# Patient Record
Sex: FEMALE | Race: WHITE | Employment: FULL TIME | URBAN - METROPOLITAN AREA
[De-identification: names, ages, dates, MRNs, and addresses within clinical notes are randomized per-mention and may not be internally consistent; named-entity substitution may affect disease eponyms.]

---

## 2017-01-06 ENCOUNTER — APPOINTMENT (OUTPATIENT)
Dept: NUTRITION/OBESITY MEDICINE | Facility: HOSPITAL | Age: 47
End: 2017-01-06
Attending: SURGERY
Payer: COMMERCIAL

## 2017-01-10 ENCOUNTER — APPOINTMENT (OUTPATIENT)
Dept: NUTRITION/OBESITY MEDICINE | Facility: HOSPITAL | Age: 47
End: 2017-01-10
Attending: SURGERY
Payer: COMMERCIAL

## 2017-01-10 ENCOUNTER — APPOINTMENT (OUTPATIENT)
Dept: NUTRITION/OBESITY MEDICINE | Facility: HOSPITAL | Age: 47
End: 2017-01-10
Attending: INTERNAL MEDICINE
Payer: COMMERCIAL

## 2017-01-11 ENCOUNTER — OFFICE VISIT (OUTPATIENT)
Dept: NUTRITION/OBESITY MEDICINE | Facility: HOSPITAL | Age: 47
End: 2017-01-11
Attending: SURGERY
Payer: COMMERCIAL

## 2017-01-11 VITALS — HEIGHT: 64.5 IN | WEIGHT: 226 LBS | BODY MASS INDEX: 38.11 KG/M2

## 2017-01-11 PROBLEM — E66.9 OBESITY (BMI 30-39.9): Status: ACTIVE | Noted: 2017-01-11

## 2017-01-11 PROBLEM — L76.82 PAIN AT SURGICAL INCISION: Status: ACTIVE | Noted: 2017-01-11

## 2017-01-11 PROBLEM — Z98.890 S/P GASTROPLASTY: Status: ACTIVE | Noted: 2017-01-11

## 2017-01-11 PROCEDURE — 97803 MED NUTRITION INDIV SUBSEQ: CPT | Performed by: DIETITIAN, REGISTERED

## 2017-01-11 NOTE — PROGRESS NOTES
[address]03 [facility] AND [facility]IN[facility]  [address] [place] [address]  Dept: [phone]: [phone]    Date: 2017    Patient:  Vance Riggins  :      1970  MRN:      [id number]    Referring Prov allergies.      Social History:    Smoking Status: Never Smoker                      Alcohol Use: No              Surgical History:        Past Surgical History    CHOLECYSTECTOMY  5/2013    HYSTERECTOMY  5-2011    REDUCTION OF LARGE BREAST Bilateral 9-2013

## 2017-01-12 NOTE — PROGRESS NOTES
[address] [facility]  [address] [place] [address]  Dept: [phone]  Loc: [phone]    01/11/2017      Bariatric Follow-up Nutrition Session    Yris Leonardodevora is a 51year old female.      As 1 tablet (75 mg total) by mouth 2 (two) times daily. , Disp: 60 tablet, Rfl: 2  •  PROAIR  (90 BASE) MCG/ACT Inhalation Aero Soln, INL 2 PFS PO Q 6 H PRN, Disp: , Rfl: 1    Height:  Ht Readings from Last 1 Encounters:  01/11/17 : 64\"    Weight:   Wt attention to amounts. Nutrition Diagnosis     Nutrition Diagnosis: Morbid obesity: Improvement shown     Intervention     Recommendations/goals:  1. Maintain/slightly increase protein and fluid intake, 2. Keep consistent with MVS (Vit D & B12 levels very

## 2017-01-31 ENCOUNTER — OFFICE VISIT (OUTPATIENT)
Dept: NUTRITION/OBESITY MEDICINE | Facility: HOSPITAL | Age: 47
End: 2017-01-31
Attending: SURGERY
Payer: COMMERCIAL

## 2017-01-31 VITALS
BODY MASS INDEX: 37.02 KG/M2 | HEIGHT: 64.5 IN | SYSTOLIC BLOOD PRESSURE: 102 MMHG | RESPIRATION RATE: 16 BRPM | WEIGHT: 219.5 LBS | HEART RATE: 62 BPM | DIASTOLIC BLOOD PRESSURE: 58 MMHG

## 2017-01-31 NOTE — PROGRESS NOTES
[facility] AND [facility] LOSS CLIN[facility]  [address] [place] [address]  Dept: [phone]  Loc: [phone]    1/31/2017    BARIATRIC EXISTING PATIENT/FOLLOW UP    HPI:  Lelia Harris is a 51year old-year old fem

## 2017-03-07 ENCOUNTER — OFFICE VISIT (OUTPATIENT)
Dept: SURGERY | Facility: CLINIC | Age: 47
End: 2017-03-07

## 2017-03-07 VITALS
WEIGHT: 209.63 LBS | BODY MASS INDEX: 35 KG/M2 | DIASTOLIC BLOOD PRESSURE: 78 MMHG | RESPIRATION RATE: 16 BRPM | SYSTOLIC BLOOD PRESSURE: 106 MMHG | HEART RATE: 71 BPM | OXYGEN SATURATION: 99 %

## 2017-03-07 VITALS — HEIGHT: 64.5 IN | BODY MASS INDEX: 35.25 KG/M2 | WEIGHT: 209 LBS

## 2017-03-07 DIAGNOSIS — E66.9 OBESITY (BMI 30-39.9): Primary | ICD-10-CM

## 2017-03-07 PROCEDURE — 97803 MED NUTRITION INDIV SUBSEQ: CPT | Performed by: DIETITIAN, REGISTERED

## 2017-03-07 NOTE — PROGRESS NOTES
[address], [address]  Dept: [phone]    3/7/2017    BARIATRIC EXISTING PATIENT/FOLLOW UP    HPI:  Lion Delgado is a 51year old-year old female who pr

## 2017-03-07 NOTE — PROGRESS NOTES
[address]  [address] [address]  Dept: [phone]  Loc: [phone]    03/07/2017      Bariatric Follow-up Nutrition Session    Irish Kev is a 51year old female.      As : 209 lb 9.6 oz  01/31/17 : 219 lb 8 oz  01/11/17 : 226 lb  01/11/17 : 226 lb 5 oz  12/27/16 : 237 lb    Weight change:  Down 17 pounds since day of surgery  BMI: Body mass index is 35.33 kg/(m^2).     Protein Intake: 60-80 grams/day  Fluid intake:  64+ oun

## 2017-03-08 ENCOUNTER — APPOINTMENT (OUTPATIENT)
Dept: SURGERY | Facility: CLINIC | Age: 47
End: 2017-03-08
Attending: SURGERY

## 2017-03-08 ENCOUNTER — OFFICE VISIT (OUTPATIENT)
Dept: SURGERY | Facility: CLINIC | Age: 47
End: 2017-03-08
Attending: INTERNAL MEDICINE

## 2017-03-08 VITALS
HEIGHT: 64 IN | BODY MASS INDEX: 35.65 KG/M2 | DIASTOLIC BLOOD PRESSURE: 78 MMHG | SYSTOLIC BLOOD PRESSURE: 119 MMHG | WEIGHT: 208.81 LBS | OXYGEN SATURATION: 98 % | HEART RATE: 68 BPM

## 2017-03-08 DIAGNOSIS — E78.00 HYPERCHOLESTEREMIA: ICD-10-CM

## 2017-03-08 DIAGNOSIS — F43.9 STRESS: ICD-10-CM

## 2017-03-08 DIAGNOSIS — E44.1 PROTEIN-CALORIE MALNUTRITION, MILD (HCC): Primary | ICD-10-CM

## 2017-03-08 DIAGNOSIS — Z98.890 S/P GASTROPLASTY: ICD-10-CM

## 2017-03-08 DIAGNOSIS — L30.4 INTERTRIGO: ICD-10-CM

## 2017-03-08 DIAGNOSIS — E66.9 OBESITY (BMI 30-39.9): ICD-10-CM

## 2017-03-08 DIAGNOSIS — E55.9 VITAMIN D DEFICIENCY: ICD-10-CM

## 2017-03-08 PROBLEM — L76.82 PAIN AT SURGICAL INCISION: Status: RESOLVED | Noted: 2017-01-11 | Resolved: 2017-03-08

## 2017-03-08 PROCEDURE — 99214 OFFICE O/P EST MOD 30 MIN: CPT | Performed by: INTERNAL MEDICINE

## 2017-03-08 RX ORDER — BUPROPION HYDROCHLORIDE 75 MG/1
75 TABLET ORAL 2 TIMES DAILY
Qty: 60 TABLET | Refills: 2 | Status: SHIPPED | OUTPATIENT
Start: 2017-03-08 | End: 2017-06-05 | Stop reason: DRUGHIGH

## 2017-03-08 RX ORDER — NYSTATIN 100000 [USP'U]/G
1 POWDER TOPICAL 4 TIMES DAILY
Qty: 30 G | Refills: 1 | Status: SHIPPED | OUTPATIENT
Start: 2017-03-08 | End: 2017-12-03

## 2017-03-08 NOTE — PROGRESS NOTES
3709 [facility] AND [facility]  [address] [place] [address]  Dept: [phone]: [phone]    Date: 2017    Patient:  Reyes Price  :      1970  MRN:      [id number]    Referring Prov No              Surgical History:        Past Surgical History    CHOLECYSTECTOMY  5/2013    HYSTERECTOMY  5-2011    REDUCTION OF LARGE BREAST Bilateral 9-2013    COLONOSCOPY  2012    LAP SLEEVE GASTRECTOMY  12/27/2016    Comment Dr Belinda Russ       Family Hi powder    Darlyn Burt MD

## 2017-05-24 ENCOUNTER — LAB ENCOUNTER (OUTPATIENT)
Dept: LAB | Facility: HOSPITAL | Age: 47
End: 2017-05-24
Attending: INTERNAL MEDICINE
Payer: COMMERCIAL

## 2017-05-24 DIAGNOSIS — E66.9 OBESITY (BMI 30-39.9): ICD-10-CM

## 2017-05-24 DIAGNOSIS — E78.00 HYPERCHOLESTEREMIA: ICD-10-CM

## 2017-05-24 DIAGNOSIS — E44.1 PROTEIN-CALORIE MALNUTRITION, MILD (HCC): ICD-10-CM

## 2017-05-24 DIAGNOSIS — Z98.890 S/P GASTROPLASTY: ICD-10-CM

## 2017-05-24 DIAGNOSIS — F43.9 STRESS: ICD-10-CM

## 2017-05-24 DIAGNOSIS — E55.9 VITAMIN D DEFICIENCY: ICD-10-CM

## 2017-05-24 PROCEDURE — 82607 VITAMIN B-12: CPT

## 2017-05-24 PROCEDURE — 36415 COLL VENOUS BLD VENIPUNCTURE: CPT

## 2017-05-24 PROCEDURE — 85025 COMPLETE CBC W/AUTO DIFF WBC: CPT

## 2017-05-24 PROCEDURE — 80053 COMPREHEN METABOLIC PANEL: CPT

## 2017-05-24 PROCEDURE — 80061 LIPID PANEL: CPT

## 2017-05-24 PROCEDURE — 85060 BLOOD SMEAR INTERPRETATION: CPT

## 2017-05-24 PROCEDURE — 84425 ASSAY OF VITAMIN B-1: CPT

## 2017-05-24 PROCEDURE — 82306 VITAMIN D 25 HYDROXY: CPT

## 2017-05-25 ENCOUNTER — TELEPHONE (OUTPATIENT)
Dept: SURGERY | Facility: CLINIC | Age: 47
End: 2017-05-25

## 2017-05-25 NOTE — TELEPHONE ENCOUNTER
Spoke to patient about elevated eosinophils and abnormal blood work. She asked me to fax to PCP    I recommended that she follows up with her PCP in regards to this abnormal blood work.     Patient understands

## 2017-06-05 PROBLEM — Z51.81 ENCOUNTER FOR THERAPEUTIC DRUG MONITORING: Status: ACTIVE | Noted: 2017-06-05

## 2017-06-05 NOTE — PROGRESS NOTES
[address] [facility] AND [facility]IN[facility]  [address] [place] [address]  Dept: [phone]: [phone]    Date: 2017    Patient:  Frieda Sepulveda  :      1970  MRN:      [id number]    Referring Prov Surgical History:        Past Surgical History    CHOLECYSTECTOMY  5/2013    HYSTERECTOMY  5-2011    REDUCTION OF LARGE BREAST Bilateral 9-2013    COLONOSCOPY  2012    LAP SLEEVE GASTRECTOMY  12/27/2016    Comment Dr Mariano Glass       Family History:     Fa

## 2017-06-06 ENCOUNTER — OFFICE VISIT (OUTPATIENT)
Dept: SURGERY | Facility: CLINIC | Age: 47
End: 2017-06-06

## 2017-06-06 VITALS — HEIGHT: 64 IN | WEIGHT: 201.38 LBS | BODY MASS INDEX: 34.38 KG/M2

## 2017-06-06 VITALS
SYSTOLIC BLOOD PRESSURE: 91 MMHG | RESPIRATION RATE: 16 BRPM | HEIGHT: 64 IN | DIASTOLIC BLOOD PRESSURE: 60 MMHG | BODY MASS INDEX: 34.38 KG/M2 | HEART RATE: 67 BPM | WEIGHT: 201.38 LBS

## 2017-06-06 DIAGNOSIS — E66.9 OBESITY (BMI 30-39.9): Primary | ICD-10-CM

## 2017-06-06 PROCEDURE — 97803 MED NUTRITION INDIV SUBSEQ: CPT | Performed by: DIETITIAN, REGISTERED

## 2017-06-06 PROCEDURE — 0358T BIA WHOLE BODY: CPT | Performed by: DIETITIAN, REGISTERED

## 2017-06-06 NOTE — PROGRESS NOTES
[address], [address]  Dept: [phone]    6/6/2017    BARIATRIC EXISTING PATIENT/FOLLOW UP    HPI:  Sidney Olivares is a 51year old-year old female who pr

## 2017-06-06 NOTE — PROGRESS NOTES
[address]  [address] [address]  Dept: [phone]  Loc: [phone]    06/06/2017      Bariatric Follow-up Nutrition Session    Jona Goldmann is a 51year old female.      As [id number] 29.7 05/24/2017       Lab Results  Component Value Date/Time   THIAMINE 83 05/24/2017 09:17 AM      No results found for: VITB1    Meds:     Current outpatient prescriptions:   •  BuPROPion HCl ER, XL, 150 MG Oral Tablet 24 Hr, Take 1 tablet (15 n/a  · Frequency: n/a    Other:  Pt with continued weight loss. Pt has switched to liquid MVI and liquid Calcium+D supplement [facility]); tired of chewable. Pt reports she has started taking B complex d/t slightly low B1.  Pt continues with appropriate port

## 2017-09-06 NOTE — PROGRESS NOTES
3701 [facility] AND [facility]IN[facility]  [address] [place] [address]  Dept: [phone]: [phone]    Date: 2017    Patient:  Ashley Alicea  :      1970  MRN:      [id number]    Referring Prov History:    Past Surgical History:  5/2013: CHOLECYSTECTOMY  2012: COLONOSCOPY  5-2011: HYSTERECTOMY  12/27/2016: LAP SLEEVE GASTRECTOMY      Comment: Dr Odilia Sexton  9-2013: REDUCTION OF LARGE BREAST Bilateral    Family History:    Family History   Problem Re Comp Metabolic Panel (14)      Vitamin B1 (Thiamine), Blood      Vitamin D, 25-Hydroxy      Vitamin B12      Lipid Panel      Thyroid Stim Hormone, assay      CBC With Differential With Platelet      Fallon Membreno MD

## 2017-12-03 DIAGNOSIS — L30.4 INTERTRIGO: ICD-10-CM

## 2017-12-05 ENCOUNTER — LAB ENCOUNTER (OUTPATIENT)
Dept: LAB | Facility: HOSPITAL | Age: 47
End: 2017-12-05
Attending: INTERNAL MEDICINE
Payer: COMMERCIAL

## 2017-12-05 ENCOUNTER — OFFICE VISIT (OUTPATIENT)
Dept: SURGERY | Facility: CLINIC | Age: 47
End: 2017-12-05

## 2017-12-05 VITALS
SYSTOLIC BLOOD PRESSURE: 102 MMHG | WEIGHT: 205.19 LBS | BODY MASS INDEX: 35.03 KG/M2 | DIASTOLIC BLOOD PRESSURE: 70 MMHG | HEIGHT: 64 IN | RESPIRATION RATE: 16 BRPM | HEART RATE: 70 BPM

## 2017-12-05 DIAGNOSIS — E53.8 LOW VITAMIN B12 LEVEL: ICD-10-CM

## 2017-12-05 DIAGNOSIS — E66.9 OBESITY (BMI 30-39.9): ICD-10-CM

## 2017-12-05 DIAGNOSIS — Z51.81 ENCOUNTER FOR THERAPEUTIC DRUG MONITORING: ICD-10-CM

## 2017-12-05 DIAGNOSIS — E55.9 VITAMIN D DEFICIENCY: ICD-10-CM

## 2017-12-05 DIAGNOSIS — Z98.890 S/P GASTROPLASTY: ICD-10-CM

## 2017-12-05 DIAGNOSIS — F43.9 STRESS: ICD-10-CM

## 2017-12-05 DIAGNOSIS — E78.5 HYPERLIPIDEMIA, UNSPECIFIED HYPERLIPIDEMIA TYPE: ICD-10-CM

## 2017-12-05 PROCEDURE — 84425 ASSAY OF VITAMIN B-1: CPT

## 2017-12-05 PROCEDURE — 82607 VITAMIN B-12: CPT

## 2017-12-05 PROCEDURE — 84443 ASSAY THYROID STIM HORMONE: CPT

## 2017-12-05 PROCEDURE — 82306 VITAMIN D 25 HYDROXY: CPT

## 2017-12-05 PROCEDURE — 80061 LIPID PANEL: CPT

## 2017-12-05 PROCEDURE — 36415 COLL VENOUS BLD VENIPUNCTURE: CPT

## 2017-12-05 PROCEDURE — 85025 COMPLETE CBC W/AUTO DIFF WBC: CPT

## 2017-12-05 PROCEDURE — 80053 COMPREHEN METABOLIC PANEL: CPT

## 2017-12-05 RX ORDER — NYSTATIN 100000 [USP'U]/G
POWDER TOPICAL
Qty: 30 G | Refills: 0 | Status: SHIPPED | OUTPATIENT
Start: 2017-12-05 | End: 2019-04-23

## 2017-12-05 NOTE — PROGRESS NOTES
[address], [address]  Dept: [phone]    12/5/2017    BARIATRIC EXISTING PATIENT/FOLLOW UP    HPI:  Lion Delgado is a 49year old-year old female who p

## 2017-12-14 ENCOUNTER — OFFICE VISIT (OUTPATIENT)
Dept: SURGERY | Facility: CLINIC | Age: 47
End: 2017-12-14

## 2017-12-14 VITALS — BODY MASS INDEX: 34.62 KG/M2 | WEIGHT: 202.81 LBS | HEIGHT: 64 IN

## 2017-12-14 DIAGNOSIS — Z98.84 S/P LAPAROSCOPIC SLEEVE GASTRECTOMY: ICD-10-CM

## 2017-12-14 DIAGNOSIS — E66.9 OBESITY (BMI 30-39.9): ICD-10-CM

## 2017-12-14 PROCEDURE — 97803 MED NUTRITION INDIV SUBSEQ: CPT | Performed by: DIETITIAN, REGISTERED

## 2017-12-14 NOTE — PROGRESS NOTES
[address]  [address] [address]  Dept: [phone]  Loc: [phone]    12/14/17      Bariatric Follow-up Nutrition Session    Joie Chris is a 49year old female.      Reina 12/05/2017       Lab Results  Component Value Date   JAQV64QW 36.1 12/05/2017       Lab Results  Component Value Date/Time   THIAMINE 124 12/05/2017 10:20 AM      No results found for: VITB1  No results found for: FOLIC     Meds:     Current Outpatient Pre minimal appetite , started new MVI see below.    Food intolerances:  None reported  Vitamin/mineral supplements:  Other: Tespo bariatric vitamins, Women's one a day, hair skin and nails and menopause vitamin, liquid Calcium, 1 tbsp aloe juice  Protein suppl

## 2017-12-20 NOTE — PROGRESS NOTES
[address] [facility]  [address] [place] [address]  Dept: [phone]: [phone]    Date: 2017    Patient:  Sujata Kinney  :      1970  MRN:      [id number]    Referring Prov HYSTERECTOMY  12/27/2016: LAP SLEEVE GASTRECTOMY      Comment: Dr Bill Shea  9-2013: REDUCTION OF LARGE BREAST Bilateral    Family History:    Family History   Problem Relation Age of Onset   • Heart Disorder Father    • Hypertension Father    • Diabetes Fat

## 2018-04-10 DIAGNOSIS — L30.4 INTERTRIGO: ICD-10-CM

## 2018-04-10 RX ORDER — NYSTATIN 100000 [USP'U]/G
POWDER TOPICAL
Qty: 30 G | Refills: 0 | OUTPATIENT
Start: 2018-04-10

## 2018-05-23 ENCOUNTER — TELEPHONE (OUTPATIENT)
Dept: SURGERY | Facility: CLINIC | Age: 48
End: 2018-05-23

## 2018-05-23 NOTE — TELEPHONE ENCOUNTER
5/22/18 @ 11:07am Spoke to Miladys at [facility], [phone], KVW#[id number]. She verified that patient has following benefits for Bariatric services: No weight management criteria. No GIGI/Blue Distinction required.  BRANDEN ([facility]#[id number]) DX E66.01 Pt has benefi

## 2018-06-12 ENCOUNTER — OFFICE VISIT (OUTPATIENT)
Dept: SURGERY | Facility: CLINIC | Age: 48
End: 2018-06-12

## 2018-06-12 VITALS
SYSTOLIC BLOOD PRESSURE: 118 MMHG | DIASTOLIC BLOOD PRESSURE: 78 MMHG | HEIGHT: 64 IN | RESPIRATION RATE: 16 BRPM | HEART RATE: 64 BPM | BODY MASS INDEX: 35.37 KG/M2 | WEIGHT: 207.19 LBS

## 2018-06-12 VITALS — HEIGHT: 64 IN | WEIGHT: 207.25 LBS | BODY MASS INDEX: 35.38 KG/M2

## 2018-06-12 DIAGNOSIS — Z98.84 S/P BARIATRIC SURGERY: ICD-10-CM

## 2018-06-12 DIAGNOSIS — E66.9 OBESITY (BMI 30-39.9): ICD-10-CM

## 2018-06-12 PROCEDURE — 97803 MED NUTRITION INDIV SUBSEQ: CPT | Performed by: DIETITIAN, REGISTERED

## 2018-06-12 NOTE — PROGRESS NOTES
[address]  [address] [address]  Dept: [phone]  Loc: [phone]    06/12/18      Bariatric Follow-up Nutrition Session    Ever Black is a 49year old female.      Reina Results  Component Value Date   FHOA29ZK 36.1 12/05/2017       Lab Results  Component Value Date/Time   THIAMINE 124 12/05/2017 10:20 AM      No results found for: VITB1  No results found for: FOLIC     Meds:     Current Outpatient Prescriptions:   •  NYST related  · Type: walk    Other:  Patient reports fluid retention led to recent wt gain while on vacation; had edema in ankles along with 14 lb water weight gain after eating high sodium meal combined with heat.  Diet history reveals appropriate protein and

## 2018-06-12 NOTE — PROGRESS NOTES
[address], [address]  Dept: [phone]    6/12/2018    BARIATRIC EXISTING PATIENT/FOLLOW UP    HPI:  Joie Chris is a 49year old-year old female who p

## 2018-06-14 ENCOUNTER — OFFICE VISIT (OUTPATIENT)
Dept: SURGERY | Facility: CLINIC | Age: 48
End: 2018-06-14

## 2018-06-14 ENCOUNTER — APPOINTMENT (OUTPATIENT)
Dept: LAB | Facility: HOSPITAL | Age: 48
End: 2018-06-14
Attending: INTERNAL MEDICINE
Payer: COMMERCIAL

## 2018-06-14 VITALS — BODY MASS INDEX: 35.01 KG/M2 | WEIGHT: 205.06 LBS | RESPIRATION RATE: 16 BRPM | HEIGHT: 64 IN

## 2018-06-14 DIAGNOSIS — Z51.81 ENCOUNTER FOR THERAPEUTIC DRUG MONITORING: ICD-10-CM

## 2018-06-14 DIAGNOSIS — R60.0 LOWER EXTREMITY EDEMA: ICD-10-CM

## 2018-06-14 DIAGNOSIS — R60.0 LOWER EXTREMITY EDEMA: Primary | ICD-10-CM

## 2018-06-14 DIAGNOSIS — E66.9 OBESITY (BMI 30-39.9): ICD-10-CM

## 2018-06-14 DIAGNOSIS — Z98.890 S/P GASTROPLASTY: ICD-10-CM

## 2018-06-14 PROCEDURE — 99214 OFFICE O/P EST MOD 30 MIN: CPT | Performed by: INTERNAL MEDICINE

## 2018-06-14 PROCEDURE — 93005 ELECTROCARDIOGRAM TRACING: CPT

## 2018-06-14 PROCEDURE — 93010 ELECTROCARDIOGRAM REPORT: CPT | Performed by: INTERNAL MEDICINE

## 2018-06-14 RX ORDER — HYDROCHLOROTHIAZIDE 12.5 MG/1
12.5 CAPSULE, GELATIN COATED ORAL DAILY
Qty: 30 CAPSULE | Refills: 1 | Status: SHIPPED | OUTPATIENT
Start: 2018-06-14 | End: 2018-08-17

## 2018-06-14 RX ORDER — PHENTERMINE HYDROCHLORIDE 15 MG/1
15 CAPSULE ORAL EVERY MORNING
Qty: 30 CAPSULE | Refills: 1 | Status: SHIPPED | OUTPATIENT
Start: 2018-06-14 | End: 2018-08-17 | Stop reason: DRUGHIGH

## 2018-06-14 NOTE — PROGRESS NOTES
3703 [facility] AND [facility]  [address] [place] [address]  Dept: [phone]: [phone]    Date: 2017    Patient:  Emilia Pulido  :      1970  MRN:      [id number]    Referring Prov Smokeless tobacco: Never Used                      Alcohol use:  No              Surgical History:    Past Surgical History:  5/2013: CHOLECYSTECTOMY  2012: COLONOSCOPY  5-2011: HYSTERECTOMY  12/27/2016: LAP SLEEVE GASTRECTOMY      Comment: Dr Tiarra Garcia powder    Will recheck CMP for BLANQUITA  Full panel due in December      Orders Placed This Encounter      Comp Metabolic Panel (14)      Jackie Amaral MD

## 2018-06-19 ENCOUNTER — OFFICE VISIT (OUTPATIENT)
Dept: NUTRITION/OBESITY MEDICINE | Facility: HOSPITAL | Age: 48
End: 2018-06-19
Attending: SURGERY
Payer: COMMERCIAL

## 2018-06-19 PROCEDURE — 98960 EDU&TRN PT SELF-MGMT NQHP 1: CPT

## 2018-06-19 NOTE — PROGRESS NOTES
Pt came in today seeking ways to keep the water weight off and to lose 20lb to the 70lb she lost already since surgery, Dec 2016. Pt has not done any kind of exercise before.  We discussed barriers that could prevent her from exercising : long hours at work

## 2018-06-26 ENCOUNTER — APPOINTMENT (OUTPATIENT)
Dept: NUTRITION/OBESITY MEDICINE | Facility: HOSPITAL | Age: 48
End: 2018-06-26
Attending: SURGERY
Payer: COMMERCIAL

## 2018-07-15 DIAGNOSIS — F43.9 STRESS: ICD-10-CM

## 2018-07-16 RX ORDER — BUPROPION HYDROCHLORIDE 150 MG/1
TABLET ORAL
Qty: 90 TABLET | Refills: 0 | Status: SHIPPED | OUTPATIENT
Start: 2018-07-16 | End: 2018-10-13

## 2018-08-17 ENCOUNTER — OFFICE VISIT (OUTPATIENT)
Dept: SURGERY | Facility: CLINIC | Age: 48
End: 2018-08-17
Payer: COMMERCIAL

## 2018-08-17 VITALS
BODY MASS INDEX: 34.84 KG/M2 | RESPIRATION RATE: 18 BRPM | SYSTOLIC BLOOD PRESSURE: 90 MMHG | HEIGHT: 64 IN | HEART RATE: 71 BPM | WEIGHT: 204.06 LBS | OXYGEN SATURATION: 99 % | DIASTOLIC BLOOD PRESSURE: 78 MMHG

## 2018-08-17 DIAGNOSIS — Z98.890 S/P GASTROPLASTY: ICD-10-CM

## 2018-08-17 DIAGNOSIS — R60.0 LOWER EXTREMITY EDEMA: Primary | ICD-10-CM

## 2018-08-17 DIAGNOSIS — E66.9 OBESITY (BMI 30-39.9): ICD-10-CM

## 2018-08-17 DIAGNOSIS — R73.09 ABNORMAL BLOOD SUGAR: ICD-10-CM

## 2018-08-17 DIAGNOSIS — E55.9 VITAMIN D DEFICIENCY: ICD-10-CM

## 2018-08-17 DIAGNOSIS — Z51.81 ENCOUNTER FOR THERAPEUTIC DRUG MONITORING: ICD-10-CM

## 2018-08-17 PROCEDURE — 99214 OFFICE O/P EST MOD 30 MIN: CPT | Performed by: INTERNAL MEDICINE

## 2018-08-17 RX ORDER — HYDROCHLOROTHIAZIDE 12.5 MG/1
TABLET ORAL
Refills: 0 | COMMUNITY
Start: 2018-06-12 | End: 2018-08-17 | Stop reason: ALTCHOICE

## 2018-08-17 RX ORDER — PHENTERMINE HYDROCHLORIDE 37.5 MG/1
37.5 TABLET ORAL
Qty: 30 TABLET | Refills: 2 | Status: SHIPPED | OUTPATIENT
Start: 2018-08-17 | End: 2019-01-08

## 2018-08-17 NOTE — PROGRESS NOTES
3705 [facility] AND [facility]IN[facility]  [address] [place] [address]  Dept: [phone]: [phone]    Date: 2017    Patient:  Kris Rankin  :      1970  MRN:      [id number]    Referring Prov Smokeless tobacco: Never Used                      Alcohol use:  No              Surgical History:    Past Surgical History:  5/2013: CHOLECYSTECTOMY  2012: COLONOSCOPY  5-2011: HYSTERECTOMY  12/27/2016: LAP SLEEVE GASTRECTOMY      Comment: Dr Benites Gather Intertrigo:   Would benefit from panniculectomy and breast lift  Continue Nystatin powder      Full panel due in December    Follow up in Jan  Will give phentermine refills      Orders Placed This Encounter      Comp Metabolic Panel (14)      Magnesium

## 2018-10-13 DIAGNOSIS — F43.9 STRESS: ICD-10-CM

## 2018-10-15 RX ORDER — BUPROPION HYDROCHLORIDE 150 MG/1
TABLET ORAL
Qty: 90 TABLET | Refills: 0 | Status: SHIPPED | OUTPATIENT
Start: 2018-10-15 | End: 2019-01-08 | Stop reason: ALTCHOICE

## 2018-12-04 ENCOUNTER — TELEPHONE (OUTPATIENT)
Dept: SURGERY | Facility: CLINIC | Age: 48
End: 2018-12-04

## 2018-12-04 RX ORDER — PHENTERMINE HYDROCHLORIDE 37.5 MG/1
CAPSULE ORAL
Qty: 30 CAPSULE | Refills: 1 | Status: SHIPPED | OUTPATIENT
Start: 2018-12-04 | End: 2019-01-08

## 2019-01-08 ENCOUNTER — OFFICE VISIT (OUTPATIENT)
Dept: SURGERY | Facility: CLINIC | Age: 49
End: 2019-01-08
Payer: COMMERCIAL

## 2019-01-08 VITALS
HEIGHT: 64 IN | RESPIRATION RATE: 18 BRPM | BODY MASS INDEX: 34.84 KG/M2 | OXYGEN SATURATION: 100 % | DIASTOLIC BLOOD PRESSURE: 80 MMHG | WEIGHT: 204.06 LBS | HEART RATE: 85 BPM | SYSTOLIC BLOOD PRESSURE: 110 MMHG

## 2019-01-08 DIAGNOSIS — E78.00 HYPERCHOLESTEREMIA: Primary | ICD-10-CM

## 2019-01-08 DIAGNOSIS — F43.9 STRESS: ICD-10-CM

## 2019-01-08 DIAGNOSIS — Z51.81 ENCOUNTER FOR THERAPEUTIC DRUG MONITORING: ICD-10-CM

## 2019-01-08 DIAGNOSIS — Z98.890 S/P GASTROPLASTY: ICD-10-CM

## 2019-01-08 DIAGNOSIS — E66.9 OBESITY (BMI 30-39.9): ICD-10-CM

## 2019-01-08 PROCEDURE — 99214 OFFICE O/P EST MOD 30 MIN: CPT | Performed by: INTERNAL MEDICINE

## 2019-01-08 RX ORDER — PHENTERMINE HYDROCHLORIDE 37.5 MG/1
37.5 TABLET ORAL
Qty: 30 TABLET | Refills: 2 | Status: SHIPPED | OUTPATIENT
Start: 2019-01-08 | End: 2019-04-23

## 2019-01-08 NOTE — PROGRESS NOTES
[address] [facility] AND [facility]IN[facility]  [address] [place] [address]  Dept: [phone]: [phone]    Date: 2017    Patient:  Jil Esteves  :      1970  MRN:      [id number]    Referring Prov Shauna Herbert MD at [facility] OR   • CHOLECYSTECTOMY  5/2013   • COLONOSCOPY  2012   • HYSTERECTOMY  5-2011   • LAP SLEEVE GASTRECTOMY  12/27/2016    Dr Mustafa Presume   • REDUCTION OF LARGE BREAST Bilateral 9-2013       Family History:    Family History   Pro and start zoloft for increased stress/anxiety  May benefit from a therapist        Will give phentermine refills    No orders of the defined types were placed in this encounter.         Fred Claros MD

## 2019-01-10 ENCOUNTER — TELEPHONE (OUTPATIENT)
Dept: SURGERY | Facility: CLINIC | Age: 49
End: 2019-01-10

## 2019-01-13 DIAGNOSIS — F43.9 STRESS: ICD-10-CM

## 2019-01-14 RX ORDER — BUPROPION HYDROCHLORIDE 150 MG/1
TABLET ORAL
Qty: 90 TABLET | Refills: 0 | Status: SHIPPED | OUTPATIENT
Start: 2019-01-14 | End: 2019-04-06

## 2019-01-21 ENCOUNTER — TELEPHONE (OUTPATIENT)
Dept: SURGERY | Facility: CLINIC | Age: 49
End: 2019-01-21

## 2019-01-21 NOTE — TELEPHONE ENCOUNTER
1/21/19 @ 2:45pm Spoke to [place] at [facility], [phone], ICZ#[id number]. She verified that patient has following benefits for Bariatric services:   • [employer]HV (PTT#[id number]) DX E66.9   - Dietitian ([id number]) – Yes. No limit,  no prior auth req’d.   - Bod

## 2019-01-22 ENCOUNTER — TELEPHONE (OUTPATIENT)
Dept: SURGERY | Facility: CLINIC | Age: 49
End: 2019-01-22

## 2019-01-22 ENCOUNTER — OFFICE VISIT (OUTPATIENT)
Dept: SURGERY | Facility: CLINIC | Age: 49
End: 2019-01-22
Payer: COMMERCIAL

## 2019-01-22 VITALS
SYSTOLIC BLOOD PRESSURE: 108 MMHG | WEIGHT: 201 LBS | HEIGHT: 64 IN | RESPIRATION RATE: 18 BRPM | OXYGEN SATURATION: 100 % | HEART RATE: 86 BPM | DIASTOLIC BLOOD PRESSURE: 74 MMHG | BODY MASS INDEX: 34.31 KG/M2

## 2019-01-22 RX ORDER — MULTIVIT-MIN/IRON/FOLIC ACID/K 18-600-40
1 CAPSULE ORAL
COMMUNITY
End: 2022-01-06 | Stop reason: ALTCHOICE

## 2019-01-22 NOTE — PROGRESS NOTES
[address], [address]  Dept: [phone]    1/22/2019    BARIATRIC EXISTING PATIENT/FOLLOW UP    HPI:  Nereida Valladares is a 51year old-year old female who p

## 2019-01-24 ENCOUNTER — TELEPHONE (OUTPATIENT)
Dept: SURGERY | Facility: CLINIC | Age: 49
End: 2019-01-24

## 2019-02-14 RX ORDER — PHENTERMINE HYDROCHLORIDE 37.5 MG/1
CAPSULE ORAL
Qty: 30 CAPSULE | Refills: 0 | Status: SHIPPED | OUTPATIENT
Start: 2019-02-14 | End: 2019-04-06

## 2019-02-15 ENCOUNTER — TELEPHONE (OUTPATIENT)
Dept: SURGERY | Facility: CLINIC | Age: 49
End: 2019-02-15

## 2019-04-06 DIAGNOSIS — F43.9 STRESS: ICD-10-CM

## 2019-04-08 ENCOUNTER — TELEPHONE (OUTPATIENT)
Dept: SURGERY | Facility: CLINIC | Age: 49
End: 2019-04-08

## 2019-04-08 RX ORDER — BUPROPION HYDROCHLORIDE 150 MG/1
TABLET ORAL
Qty: 90 TABLET | Refills: 0 | Status: SHIPPED | OUTPATIENT
Start: 2019-04-08 | End: 2019-05-24

## 2019-04-08 RX ORDER — PHENTERMINE HYDROCHLORIDE 37.5 MG/1
CAPSULE ORAL
Qty: 30 CAPSULE | Refills: 0 | Status: SHIPPED | OUTPATIENT
Start: 2019-04-08 | End: 2019-04-23

## 2019-04-22 ENCOUNTER — LAB ENCOUNTER (OUTPATIENT)
Dept: LAB | Age: 49
End: 2019-04-22
Attending: INTERNAL MEDICINE
Payer: COMMERCIAL

## 2019-04-22 DIAGNOSIS — E78.00 HYPERCHOLESTEREMIA: ICD-10-CM

## 2019-04-22 DIAGNOSIS — Z51.81 ENCOUNTER FOR THERAPEUTIC DRUG MONITORING: ICD-10-CM

## 2019-04-22 DIAGNOSIS — R73.09 ABNORMAL BLOOD SUGAR: ICD-10-CM

## 2019-04-22 DIAGNOSIS — R60.0 LOWER EXTREMITY EDEMA: ICD-10-CM

## 2019-04-22 DIAGNOSIS — F43.9 STRESS: ICD-10-CM

## 2019-04-22 DIAGNOSIS — E55.9 VITAMIN D DEFICIENCY: ICD-10-CM

## 2019-04-22 DIAGNOSIS — Z98.890 S/P GASTROPLASTY: ICD-10-CM

## 2019-04-22 DIAGNOSIS — E66.9 OBESITY (BMI 30-39.9): ICD-10-CM

## 2019-04-22 PROCEDURE — 84443 ASSAY THYROID STIM HORMONE: CPT

## 2019-04-22 PROCEDURE — 82397 CHEMILUMINESCENT ASSAY: CPT

## 2019-04-22 PROCEDURE — 84425 ASSAY OF VITAMIN B-1: CPT

## 2019-04-22 PROCEDURE — 84100 ASSAY OF PHOSPHORUS: CPT

## 2019-04-22 PROCEDURE — 83735 ASSAY OF MAGNESIUM: CPT

## 2019-04-22 PROCEDURE — 36415 COLL VENOUS BLD VENIPUNCTURE: CPT

## 2019-04-22 PROCEDURE — 82306 VITAMIN D 25 HYDROXY: CPT

## 2019-04-22 PROCEDURE — 80061 LIPID PANEL: CPT

## 2019-04-22 PROCEDURE — 82607 VITAMIN B-12: CPT

## 2019-04-22 PROCEDURE — 85025 COMPLETE CBC W/AUTO DIFF WBC: CPT

## 2019-04-22 PROCEDURE — 80053 COMPREHEN METABOLIC PANEL: CPT

## 2019-04-23 ENCOUNTER — OFFICE VISIT (OUTPATIENT)
Dept: SURGERY | Facility: CLINIC | Age: 49
End: 2019-04-23
Payer: COMMERCIAL

## 2019-04-23 VITALS
DIASTOLIC BLOOD PRESSURE: 70 MMHG | BODY MASS INDEX: 34.15 KG/M2 | WEIGHT: 200 LBS | HEIGHT: 64 IN | SYSTOLIC BLOOD PRESSURE: 100 MMHG

## 2019-04-23 DIAGNOSIS — E78.00 HYPERCHOLESTEREMIA: Primary | ICD-10-CM

## 2019-04-23 DIAGNOSIS — L30.4 INTERTRIGO: ICD-10-CM

## 2019-04-23 DIAGNOSIS — E66.9 OBESITY (BMI 30-39.9): ICD-10-CM

## 2019-04-23 DIAGNOSIS — B37.2 CANDIDAL INTERTRIGO: ICD-10-CM

## 2019-04-23 DIAGNOSIS — E55.9 VITAMIN D DEFICIENCY: ICD-10-CM

## 2019-04-23 DIAGNOSIS — Z98.890 S/P GASTROPLASTY: ICD-10-CM

## 2019-04-23 DIAGNOSIS — F43.9 STRESS: ICD-10-CM

## 2019-04-23 PROCEDURE — 99214 OFFICE O/P EST MOD 30 MIN: CPT | Performed by: INTERNAL MEDICINE

## 2019-04-23 RX ORDER — NYSTATIN 100000 [USP'U]/G
POWDER TOPICAL
Qty: 30 G | Refills: 2 | Status: SHIPPED | OUTPATIENT
Start: 2019-04-23 | End: 2019-08-12

## 2019-04-23 RX ORDER — PHENTERMINE HYDROCHLORIDE 37.5 MG/1
CAPSULE ORAL
Qty: 30 CAPSULE | Refills: 2 | Status: SHIPPED | OUTPATIENT
Start: 2019-05-02 | End: 2019-08-12 | Stop reason: DRUGHIGH

## 2019-04-23 RX ORDER — ERGOCALCIFEROL (VITAMIN D2) 1250 MCG
50000 CAPSULE ORAL WEEKLY
Qty: 12 CAPSULE | Refills: 2 | Status: SHIPPED | OUTPATIENT
Start: 2019-04-23 | End: 2020-02-07

## 2019-04-23 NOTE — PROGRESS NOTES
[address]  [address] [facility] [address] [facility] [address]  Dept: [phone]  Loc: [phone]        Patient:  Reyes Price  :      1970  MRN:      [id number]    Referring Provider: Linda Nassar History:  Social History    Tobacco Use      Smoking status: Never Smoker      Smokeless tobacco: Never Used    Alcohol use: No      Alcohol/week: 0.0 oz    Drug use: No    Surgical History:    Past Surgical History:   Procedure Laterality Date   • Kenney  S/p VSG 12/27/2016      Stress: continue wellbutrin    Tolerating phentermine  Increase to 37.5 mg    ekg done    BLANQUITA: improved   D/C HCTZ    Doing well    Follow up     Intertrigo:   Would benefit from panniculectomy and breast lift  Continue Nystatin

## 2019-05-24 DIAGNOSIS — F43.9 STRESS: ICD-10-CM

## 2019-05-24 RX ORDER — BUPROPION HYDROCHLORIDE 150 MG/1
TABLET ORAL
Qty: 90 TABLET | Refills: 0 | Status: SHIPPED | OUTPATIENT
Start: 2019-05-24 | End: 2019-08-12

## 2019-08-12 NOTE — PROGRESS NOTES
[address]  [address] [facility] [address]  Dept: [phone]  Loc: [phone]        Patient:  Pierre Martinez  :      1970  MRN:      [id number]    Referring Provider: Elliot Fuentes allergies.      Social History:  Social History    Tobacco Use      Smoking status: Never Smoker      Smokeless tobacco: Never Used    Alcohol use: No      Alcohol/week: 0.0 standard drinks    Drug use: No    Surgical History:    Past Surgical History:   Pr monitoring  Obesity (bmi 30-39. 9)    Plan     S/p VSG 12/27/2016      Stress: continue wellbutrin    Tolerating phentermine  Refill at 37.5 mg    ekg done    BLANQUITA: improved   D/C HCTZ    Doing well    Follow up     Intertrigo:   Would benefit from pannicule

## 2019-12-03 ENCOUNTER — OFFICE VISIT (OUTPATIENT)
Dept: SURGERY | Facility: CLINIC | Age: 49
End: 2019-12-03
Payer: COMMERCIAL

## 2019-12-03 VITALS
RESPIRATION RATE: 16 BRPM | WEIGHT: 204 LBS | DIASTOLIC BLOOD PRESSURE: 60 MMHG | BODY MASS INDEX: 34.83 KG/M2 | HEART RATE: 66 BPM | HEIGHT: 64 IN | SYSTOLIC BLOOD PRESSURE: 110 MMHG

## 2019-12-03 DIAGNOSIS — E66.01 CLASS 2 SEVERE OBESITY DUE TO EXCESS CALORIES WITH SERIOUS COMORBIDITY AND BODY MASS INDEX (BMI) OF 35.0 TO 35.9 IN ADULT (HCC): Primary | ICD-10-CM

## 2019-12-03 RX ORDER — CHOLESTYRAMINE 4 G/9G
4 POWDER, FOR SUSPENSION ORAL
Qty: 90 PACKET | Refills: 3 | Status: SHIPPED | OUTPATIENT
Start: 2019-12-03 | End: 2020-01-02

## 2019-12-03 NOTE — PROGRESS NOTES
[address] [address] [facility] [address]  Dept: [phone]    12/3/2019    BARIATRIC EXISTING PATIENT/FOLLOW UP    HPI:  Kris Rankin is a 47year old-year old female who

## 2019-12-30 DIAGNOSIS — F43.9 STRESS: ICD-10-CM

## 2019-12-30 RX ORDER — BUPROPION HYDROCHLORIDE 150 MG/1
TABLET ORAL
Qty: 90 TABLET | Refills: 0 | Status: SHIPPED | OUTPATIENT
Start: 2019-12-30 | End: 2020-06-02

## 2020-02-06 DIAGNOSIS — E55.9 VITAMIN D DEFICIENCY: ICD-10-CM

## 2020-02-07 RX ORDER — ERGOCALCIFEROL 1.25 MG/1
CAPSULE ORAL
Qty: 12 CAPSULE | Refills: 2 | Status: SHIPPED | OUTPATIENT
Start: 2020-02-07 | End: 2020-08-26

## 2020-02-28 DIAGNOSIS — L30.4 INTERTRIGO: ICD-10-CM

## 2020-02-28 RX ORDER — PHENTERMINE HYDROCHLORIDE 37.5 MG/1
TABLET ORAL
Qty: 30 TABLET | Refills: 0 | Status: SHIPPED | OUTPATIENT
Start: 2020-02-28 | End: 2020-04-16

## 2020-02-28 RX ORDER — NYSTATIN 100000 [USP'U]/G
POWDER TOPICAL
Qty: 30 G | Refills: 2 | Status: SHIPPED | OUTPATIENT
Start: 2020-02-28 | End: 2020-12-03

## 2020-04-16 RX ORDER — PHENTERMINE HYDROCHLORIDE 37.5 MG/1
TABLET ORAL
Qty: 30 TABLET | Refills: 0 | Status: SHIPPED | OUTPATIENT
Start: 2020-04-16 | End: 2020-06-01

## 2020-06-01 ENCOUNTER — VIRTUAL PHONE E/M (OUTPATIENT)
Dept: SURGERY | Facility: CLINIC | Age: 50
End: 2020-06-01
Payer: COMMERCIAL

## 2020-06-01 VITALS — WEIGHT: 193 LBS | BODY MASS INDEX: 32.95 KG/M2 | HEIGHT: 64 IN

## 2020-06-01 DIAGNOSIS — R60.0 LOWER EXTREMITY EDEMA: Primary | ICD-10-CM

## 2020-06-01 DIAGNOSIS — Z51.81 ENCOUNTER FOR THERAPEUTIC DRUG MONITORING: ICD-10-CM

## 2020-06-01 DIAGNOSIS — Z98.890 S/P GASTROPLASTY: ICD-10-CM

## 2020-06-01 DIAGNOSIS — E66.9 OBESITY (BMI 30-39.9): ICD-10-CM

## 2020-06-01 PROCEDURE — 99213 OFFICE O/P EST LOW 20 MIN: CPT | Performed by: INTERNAL MEDICINE

## 2020-06-01 RX ORDER — PHENTERMINE HYDROCHLORIDE 37.5 MG/1
18.75 TABLET ORAL 2 TIMES DAILY WITH MEALS
Qty: 30 TABLET | Refills: 2 | Status: SHIPPED | OUTPATIENT
Start: 2020-06-01 | End: 2020-08-26

## 2020-06-01 NOTE — PROGRESS NOTES
[address]  [address] [address]  Dept: [phone]  Loc: [phone]    Virtual Telephone Check-In    Nereidaimani Valladares verbally consents to a Virtual/Telephone Check-In visit on BY MOUTH DAILY, Disp: 90 tablet, Rfl: 0  •  Hyoscyamine Sulfate 0.125 MG Sublingual SL Tab, DISSOLVE 1 T UNDER THE TONGUE PRN, Disp: , Rfl: 1  •  FA-Cyanocobalamin-B6-D-Ca-Aloe (D 1000 PLUS ALOE) Oral Tab, daily, Disp: , Rfl:   •  Sertraline HCl 50 MG Oral negative    Assessment       Encounter Diagnosis(ses):   Lower extremity edema  (primary encounter diagnosis)  S/p gastroplasty  Encounter for therapeutic drug monitoring  Obesity (bmi 30-39. 9)    Plan     S/p VSG 12/27/2016      Stress: continue wellbutri

## 2020-06-02 DIAGNOSIS — F43.9 STRESS: ICD-10-CM

## 2020-06-02 RX ORDER — BUPROPION HYDROCHLORIDE 150 MG/1
TABLET ORAL
Qty: 90 TABLET | Refills: 0 | Status: SHIPPED | OUTPATIENT
Start: 2020-06-02 | End: 2020-08-26

## 2020-08-26 ENCOUNTER — OFFICE VISIT (OUTPATIENT)
Dept: SURGERY | Facility: CLINIC | Age: 50
End: 2020-08-26
Payer: COMMERCIAL

## 2020-08-26 ENCOUNTER — APPOINTMENT (OUTPATIENT)
Dept: LAB | Facility: HOSPITAL | Age: 50
End: 2020-08-26
Attending: INTERNAL MEDICINE
Payer: COMMERCIAL

## 2020-08-26 VITALS
DIASTOLIC BLOOD PRESSURE: 80 MMHG | SYSTOLIC BLOOD PRESSURE: 110 MMHG | BODY MASS INDEX: 33.46 KG/M2 | HEIGHT: 64 IN | WEIGHT: 196 LBS

## 2020-08-26 DIAGNOSIS — E51.9 THIAMINE DEFICIENCY: ICD-10-CM

## 2020-08-26 DIAGNOSIS — F43.9 STRESS: ICD-10-CM

## 2020-08-26 DIAGNOSIS — E44.1 MILD PROTEIN-CALORIE MALNUTRITION (HCC): Primary | ICD-10-CM

## 2020-08-26 DIAGNOSIS — Z98.890 S/P GASTROPLASTY: ICD-10-CM

## 2020-08-26 DIAGNOSIS — Z51.81 ENCOUNTER FOR THERAPEUTIC DRUG MONITORING: ICD-10-CM

## 2020-08-26 DIAGNOSIS — E44.1 MILD PROTEIN-CALORIE MALNUTRITION (HCC): ICD-10-CM

## 2020-08-26 DIAGNOSIS — E66.9 OBESITY (BMI 30-39.9): ICD-10-CM

## 2020-08-26 PROCEDURE — 93005 ELECTROCARDIOGRAM TRACING: CPT

## 2020-08-26 PROCEDURE — 82397 CHEMILUMINESCENT ASSAY: CPT

## 2020-08-26 PROCEDURE — 3074F SYST BP LT 130 MM HG: CPT | Performed by: INTERNAL MEDICINE

## 2020-08-26 PROCEDURE — 3008F BODY MASS INDEX DOCD: CPT | Performed by: INTERNAL MEDICINE

## 2020-08-26 PROCEDURE — 99214 OFFICE O/P EST MOD 30 MIN: CPT | Performed by: INTERNAL MEDICINE

## 2020-08-26 PROCEDURE — 96372 THER/PROPH/DIAG INJ SC/IM: CPT | Performed by: INTERNAL MEDICINE

## 2020-08-26 PROCEDURE — 36415 COLL VENOUS BLD VENIPUNCTURE: CPT

## 2020-08-26 PROCEDURE — 3079F DIAST BP 80-89 MM HG: CPT | Performed by: INTERNAL MEDICINE

## 2020-08-26 PROCEDURE — 84425 ASSAY OF VITAMIN B-1: CPT

## 2020-08-26 PROCEDURE — 93010 ELECTROCARDIOGRAM REPORT: CPT | Performed by: INTERNAL MEDICINE

## 2020-08-26 RX ORDER — BUPROPION HYDROCHLORIDE 300 MG/1
TABLET ORAL
COMMUNITY
End: 2021-10-01

## 2020-08-26 RX ORDER — THIAMINE HYDROCHLORIDE 100 MG/ML
100 INJECTION, SOLUTION INTRAMUSCULAR; INTRAVENOUS ONCE
Status: COMPLETED | OUTPATIENT
Start: 2020-08-26 | End: 2020-08-26

## 2020-08-26 RX ORDER — GABAPENTIN 300 MG/1
CAPSULE ORAL
COMMUNITY
Start: 2020-08-24 | End: 2020-12-03

## 2020-08-26 RX ORDER — PHENTERMINE HYDROCHLORIDE 37.5 MG/1
TABLET ORAL
Qty: 30 TABLET | Refills: 2 | Status: SHIPPED | OUTPATIENT
Start: 2020-08-26 | End: 2020-12-03

## 2020-08-26 RX ORDER — ROSUVASTATIN CALCIUM 5 MG/1
TABLET, COATED ORAL
COMMUNITY
Start: 2020-08-04

## 2020-08-26 RX ORDER — DOXYCYCLINE 50 MG/1
CAPSULE ORAL
COMMUNITY
Start: 2020-08-04

## 2020-08-26 RX ADMIN — THIAMINE HYDROCHLORIDE 100 MG: 100 INJECTION, SOLUTION INTRAMUSCULAR; INTRAVENOUS at 10:32:00

## 2020-08-26 NOTE — PROGRESS NOTES
[address]  [address] [facility] [address]  Dept: [phone]  Loc: [phone]        Patient:  Nereida Valladares  :      1970  MRN:      [id number]    Referring Provider: Gideon Fitch Cholecalciferol (VITAMIN D) 2000 units Oral Cap, Take 1 capsule by mouth., Disp: , Rfl:     Allergies:  Patient has no known allergies.      Social History:  Social History    Tobacco Use      Smoking status: Never Smoker      Smokeless tobacco: Never Used Diagnosis(ses):   Stress  Encounter for therapeutic drug monitoring  S/p gastroplasty  Obesity (bmi 30-39. 9)  Mild protein-calorie malnutrition (hcc)  (primary encounter diagnosis)    Plan     S/p VSG 12/27/2016      Stress: continue wellbutrin    Tolerati

## 2020-08-30 LAB — VITAMIN B1 (THIAMINE), WHOLE B: 126 NMOL/L

## 2020-09-03 LAB — LEPTIN: 47.3 NG/ML

## 2020-12-03 NOTE — PROGRESS NOTES
[address]  [address] [facility] [address]  Dept: [phone]  Loc: [phone]        Patient:  Gerber Kate  :      1970  MRN:      [id number]    Referring Provider: Cyndi Adkins every evening. Take half tablet for the first week, Disp: 90 tablet, Rfl: 2  •  Cholecalciferol (VITAMIN D) 2000 units Oral Cap, Take 1 capsule by mouth., Disp: , Rfl:     Allergies:  Patient has no known allergies.      Social History:  Social History    T stress  Endocrine: negative  All other systems were reviewed and are negative    Assessment       Encounter Diagnosis(ses):   Stress  (primary encounter diagnosis)  Encounter for therapeutic drug monitoring  S/p gastroplasty  Obesity (bmi 30-39. 9)  Mild pr

## 2021-01-29 DIAGNOSIS — E55.9 VITAMIN D DEFICIENCY: ICD-10-CM

## 2021-02-01 RX ORDER — TOPIRAMATE 25 MG/1
TABLET ORAL
Qty: 90 TABLET | Refills: 0 | OUTPATIENT
Start: 2021-02-01

## 2021-02-01 RX ORDER — ERGOCALCIFEROL 1.25 MG/1
CAPSULE ORAL
Qty: 12 CAPSULE | Refills: 2 | OUTPATIENT
Start: 2021-02-01

## 2021-02-01 RX ORDER — TOPIRAMATE 25 MG/1
25 TABLET ORAL EVERY EVENING
Qty: 90 TABLET | Refills: 0 | Status: SHIPPED | OUTPATIENT
Start: 2021-02-01 | End: 2021-03-17

## 2021-03-17 PROBLEM — E44.1 MILD PROTEIN-CALORIE MALNUTRITION (HCC): Status: ACTIVE | Noted: 2021-03-17

## 2021-03-17 NOTE — PROGRESS NOTES
[address]  [address] [facility] [address] [facility] [address]  Dept: [phone]  Loc: [phone]        Patient:  Emilia Pulido  :      1970  MRN:      [id number]    Referring Provider: Tesfaye Watts PLUS ALOE) Oral Tab, daily, Disp: , Rfl:   •  Cholecalciferol (VITAMIN D) 2000 units Oral Cap, Take 1 capsule by mouth., Disp: , Rfl:     Allergies:  Patient has no known allergies.      Social History:  Social History    Tobacco Use      Smoking status: Ne were reviewed and are negative    Assessment       Encounter Diagnosis(ses):   Stress  (primary encounter diagnosis)  Mild protein-calorie malnutrition (hcc)  S/p gastroplasty  Encounter for therapeutic drug monitoring  Obesity (bmi 30-39. 9)    Plan     S/

## 2021-06-11 ENCOUNTER — OFFICE VISIT (OUTPATIENT)
Dept: SURGERY | Facility: CLINIC | Age: 51
End: 2021-06-11
Payer: COMMERCIAL

## 2021-06-11 VITALS
BODY MASS INDEX: 32.61 KG/M2 | DIASTOLIC BLOOD PRESSURE: 72 MMHG | HEART RATE: 72 BPM | WEIGHT: 191 LBS | OXYGEN SATURATION: 96 % | SYSTOLIC BLOOD PRESSURE: 118 MMHG | HEIGHT: 64 IN

## 2021-06-11 DIAGNOSIS — Z51.81 ENCOUNTER FOR THERAPEUTIC DRUG MONITORING: ICD-10-CM

## 2021-06-11 DIAGNOSIS — E44.1 MILD PROTEIN-CALORIE MALNUTRITION (HCC): Primary | ICD-10-CM

## 2021-06-11 DIAGNOSIS — E66.9 OBESITY (BMI 30-39.9): ICD-10-CM

## 2021-06-11 DIAGNOSIS — Z98.890 S/P GASTROPLASTY: ICD-10-CM

## 2021-06-11 PROCEDURE — 3008F BODY MASS INDEX DOCD: CPT | Performed by: INTERNAL MEDICINE

## 2021-06-11 PROCEDURE — 3074F SYST BP LT 130 MM HG: CPT | Performed by: INTERNAL MEDICINE

## 2021-06-11 PROCEDURE — 3078F DIAST BP <80 MM HG: CPT | Performed by: INTERNAL MEDICINE

## 2021-06-11 PROCEDURE — 99214 OFFICE O/P EST MOD 30 MIN: CPT | Performed by: INTERNAL MEDICINE

## 2021-06-11 RX ORDER — THIAMINE MONONITRATE (VIT B1) 100 MG
100 TABLET ORAL DAILY
COMMUNITY

## 2021-06-11 RX ORDER — PHENTERMINE HYDROCHLORIDE 37.5 MG/1
37.5 TABLET ORAL
Qty: 30 TABLET | Refills: 2 | Status: SHIPPED | OUTPATIENT
Start: 2021-06-11 | End: 2021-10-01

## 2021-06-11 NOTE — PROGRESS NOTES
[address]  [address] [facility] [address]  Dept: [phone]  Loc: [phone]        Patient:  Sidney Olivares  :      1970  MRN:      [id number]    Referring Provider: Kedar Tan FA-Cyanocobalamin-B6-D-Ca-Aloe (D 1000 PLUS ALOE) Oral Tab, daily, Disp: , Rfl:   •  Cholecalciferol (VITAMIN D) 2000 units Oral Cap, Take 1 capsule by mouth., Disp: , Rfl:     Allergies:  Patient has no known allergies.      Social History:  Social History Mild protein-calorie malnutrition (hcc)  (primary encounter diagnosis)  S/p gastroplasty  Encounter for therapeutic drug monitoring  Obesity (bmi 30-39. 9)    Plan     S/p VSG 12/27/2016      Stress: continue wellbutrin      ekg done    BLANQUITA: improved

## 2021-10-01 ENCOUNTER — OFFICE VISIT (OUTPATIENT)
Dept: SURGERY | Facility: CLINIC | Age: 51
End: 2021-10-01
Payer: COMMERCIAL

## 2021-10-01 VITALS
BODY MASS INDEX: 33.17 KG/M2 | SYSTOLIC BLOOD PRESSURE: 124 MMHG | DIASTOLIC BLOOD PRESSURE: 72 MMHG | HEIGHT: 64 IN | WEIGHT: 194.31 LBS | HEART RATE: 87 BPM | OXYGEN SATURATION: 98 %

## 2021-10-01 DIAGNOSIS — E51.9 THIAMINE DEFICIENCY: ICD-10-CM

## 2021-10-01 DIAGNOSIS — E66.9 OBESITY (BMI 30-39.9): ICD-10-CM

## 2021-10-01 DIAGNOSIS — F43.9 STRESS: ICD-10-CM

## 2021-10-01 DIAGNOSIS — Z98.890 S/P GASTROPLASTY: ICD-10-CM

## 2021-10-01 DIAGNOSIS — E44.1 MILD PROTEIN-CALORIE MALNUTRITION (HCC): Primary | ICD-10-CM

## 2021-10-01 DIAGNOSIS — Z51.81 ENCOUNTER FOR THERAPEUTIC DRUG MONITORING: ICD-10-CM

## 2021-10-01 DIAGNOSIS — E55.9 VITAMIN D DEFICIENCY: ICD-10-CM

## 2021-10-01 PROCEDURE — 3008F BODY MASS INDEX DOCD: CPT | Performed by: INTERNAL MEDICINE

## 2021-10-01 PROCEDURE — 3078F DIAST BP <80 MM HG: CPT | Performed by: INTERNAL MEDICINE

## 2021-10-01 PROCEDURE — 3074F SYST BP LT 130 MM HG: CPT | Performed by: INTERNAL MEDICINE

## 2021-10-01 PROCEDURE — 99214 OFFICE O/P EST MOD 30 MIN: CPT | Performed by: INTERNAL MEDICINE

## 2021-10-01 RX ORDER — PHENTERMINE HYDROCHLORIDE 37.5 MG/1
37.5 TABLET ORAL
Qty: 30 TABLET | Refills: 2 | Status: SHIPPED | OUTPATIENT
Start: 2021-10-01 | End: 2022-01-31

## 2021-10-01 RX ORDER — TOPIRAMATE 25 MG/1
25 TABLET ORAL 2 TIMES DAILY
Qty: 180 TABLET | Refills: 1 | Status: SHIPPED | OUTPATIENT
Start: 2021-10-01 | End: 2021-12-30

## 2021-10-01 RX ORDER — BUPROPION HYDROCHLORIDE 300 MG/1
300 TABLET ORAL EVERY EVENING
Qty: 90 TABLET | Refills: 2 | Status: SHIPPED | OUTPATIENT
Start: 2021-10-01 | End: 2021-12-30

## 2021-10-01 RX ORDER — PANTOPRAZOLE SODIUM 20 MG/1
20 TABLET, DELAYED RELEASE ORAL
Qty: 90 TABLET | Refills: 2 | Status: SHIPPED | OUTPATIENT
Start: 2021-10-01 | End: 2021-12-30

## 2021-10-01 NOTE — PROGRESS NOTES
[address]  [address] [facility] [address] [facility] [address]  Dept: [phone]  Loc: [phone]        Patient:  Jil Esteves  :      1970  MRN:      [id number]    Referring Provider: Judit Goldberg units Oral Cap, Take 1 capsule by mouth., Disp: , Rfl:     Allergies:  Patient has no known allergies.      Social History:  Social History    Tobacco Use      Smoking status: Never Smoker      Smokeless tobacco: Never Used    Alcohol use: No      Alcohol/w therapeutic drug monitoring  Obesity (bmi 30-39. 9)  Stress  Vitamin d deficiency  Thiamine deficiency    Plan     S/p VSG 12/27/2016      Stress: continue wellbutrin  Refer to Dr Arturo Taylor      ekg due    BLANQUITA: improved     Doing well    Intertrigo:   Contin

## 2022-01-06 ENCOUNTER — OFFICE VISIT (OUTPATIENT)
Dept: SURGERY | Facility: CLINIC | Age: 52
End: 2022-01-06
Payer: COMMERCIAL

## 2022-01-06 VITALS
BODY MASS INDEX: 34.15 KG/M2 | WEIGHT: 200 LBS | HEIGHT: 64 IN | SYSTOLIC BLOOD PRESSURE: 120 MMHG | DIASTOLIC BLOOD PRESSURE: 80 MMHG

## 2022-01-06 DIAGNOSIS — E44.1 MILD PROTEIN-CALORIE MALNUTRITION (HCC): Primary | ICD-10-CM

## 2022-01-06 DIAGNOSIS — Z51.81 ENCOUNTER FOR THERAPEUTIC DRUG MONITORING: ICD-10-CM

## 2022-01-06 DIAGNOSIS — E66.9 OBESITY (BMI 30-39.9): ICD-10-CM

## 2022-01-06 DIAGNOSIS — Z98.890 S/P GASTROPLASTY: ICD-10-CM

## 2022-01-06 PROCEDURE — 3074F SYST BP LT 130 MM HG: CPT | Performed by: INTERNAL MEDICINE

## 2022-01-06 PROCEDURE — 3079F DIAST BP 80-89 MM HG: CPT | Performed by: INTERNAL MEDICINE

## 2022-01-06 PROCEDURE — 3008F BODY MASS INDEX DOCD: CPT | Performed by: INTERNAL MEDICINE

## 2022-01-06 PROCEDURE — 99214 OFFICE O/P EST MOD 30 MIN: CPT | Performed by: INTERNAL MEDICINE

## 2022-01-06 RX ORDER — PROPRANOLOL HYDROCHLORIDE 20 MG/1
TABLET ORAL
COMMUNITY

## 2022-01-06 RX ORDER — CALCIUM CARBONATE 500(1250)
TABLET ORAL
COMMUNITY

## 2022-01-06 RX ORDER — BUPROPION HYDROCHLORIDE 300 MG/1
TABLET ORAL
COMMUNITY
Start: 2021-12-31

## 2022-01-06 RX ORDER — ASPIRIN 81 MG/1
TABLET, CHEWABLE ORAL
COMMUNITY
Start: 2020-01-01

## 2022-01-06 RX ORDER — PYRIDOXINE HCL (VITAMIN B6) 50 MG
TABLET ORAL
COMMUNITY
Start: 2020-01-01

## 2022-01-06 RX ORDER — DOXYCYCLINE HYCLATE 50 MG/1
50 CAPSULE ORAL 2 TIMES DAILY
COMMUNITY
Start: 2021-12-07 | End: 2022-01-06 | Stop reason: ALTCHOICE

## 2022-01-06 RX ORDER — ALBUTEROL SULFATE 90 UG/1
2 AEROSOL, METERED RESPIRATORY (INHALATION) EVERY 4 HOURS PRN
COMMUNITY
Start: 2021-12-07

## 2022-01-06 RX ORDER — DICYCLOMINE HCL 20 MG
TABLET ORAL
COMMUNITY
Start: 2021-10-27

## 2022-01-06 RX ORDER — TOPIRAMATE 50 MG/1
1 TABLET, FILM COATED ORAL 2 TIMES DAILY
COMMUNITY
Start: 2021-08-09

## 2022-01-06 RX ORDER — GLUCOSAMINE/D3/BOSWELLIA SERRA 1500MG-400
TABLET ORAL
COMMUNITY

## 2022-01-06 RX ORDER — CLINDAMYCIN PHOSPHATE 11.9 MG/ML
1 SOLUTION TOPICAL AS DIRECTED
COMMUNITY
Start: 2021-09-09

## 2022-01-06 RX ORDER — PANTOPRAZOLE SODIUM 20 MG/1
TABLET, DELAYED RELEASE ORAL
COMMUNITY
Start: 2021-12-31

## 2022-01-06 RX ORDER — SUVOREXANT 20 MG/1
1 TABLET, FILM COATED ORAL NIGHTLY
COMMUNITY
Start: 2021-12-15

## 2022-01-06 NOTE — PROGRESS NOTES
[address]  [facility] [facility] [address]  Dept: [phone]  Loc: [phone]        Patient:  Genoveva Harvey  :      1970  MRN:      [id number]    Referring Provider: Renea Marques propranolol 20 MG Oral Tab, propranolol 20 mg tablet  TAKE 1 TABLET BY MOUTH UP TO TWICE DAILY AS NEEDED FOR TEMPORARY TREMOR CONTROL, Disp: , Rfl:   •  BELSOMRA 20 MG Oral Tab, Take 1 tablet by mouth nightly., Disp: , Rfl:   •  Phentermine HCl 37.5 MG Ora and rhythm  Abdomen: soft, non-tender; bowel sounds normal; no masses,  no organomegaly  Extremities: extremities normal, atraumatic, no cyanosis or edema  Skin: redness under pannus     ROS:    Constitutional: negative  Respiratory: negative  Cardiovascul

## 2022-01-18 ENCOUNTER — TELEPHONE (OUTPATIENT)
Dept: SURGERY | Facility: CLINIC | Age: 52
End: 2022-01-18

## 2022-01-18 ENCOUNTER — OFFICE VISIT (OUTPATIENT)
Dept: SURGERY | Facility: CLINIC | Age: 52
End: 2022-01-18
Payer: COMMERCIAL

## 2022-01-18 VITALS
DIASTOLIC BLOOD PRESSURE: 70 MMHG | OXYGEN SATURATION: 98 % | WEIGHT: 198 LBS | BODY MASS INDEX: 33.8 KG/M2 | HEART RATE: 68 BPM | SYSTOLIC BLOOD PRESSURE: 110 MMHG | HEIGHT: 64 IN

## 2022-01-18 NOTE — PROGRESS NOTES
[address]  Dept: [phone]    1/18/2022    BARIATRIC EXISTING PATIENT/FOLLOW UP    HPI:  Manju Velazco is a 45year old-year old female who her a list of things to try. Mostly, I think she will benefit from increased fiber intake and water intake. If this is not enough we can try some prune juice or potentially even MiraLAX.   Instructions were provided to the patient and all of this was writ

## 2022-01-31 RX ORDER — PHENTERMINE HYDROCHLORIDE 37.5 MG/1
TABLET ORAL
Qty: 30 TABLET | Refills: 0 | Status: SHIPPED | OUTPATIENT
Start: 2022-01-31

## 2022-02-24 RX ORDER — PHENTERMINE HYDROCHLORIDE 37.5 MG/1
TABLET ORAL
Qty: 30 TABLET | Refills: 2 | Status: SHIPPED | OUTPATIENT
Start: 2022-02-24

## 2022-04-05 ENCOUNTER — OFFICE VISIT (OUTPATIENT)
Dept: SURGERY | Facility: CLINIC | Age: 52
End: 2022-04-05
Payer: COMMERCIAL

## 2022-04-05 VITALS
OXYGEN SATURATION: 96 % | HEART RATE: 82 BPM | HEIGHT: 64 IN | BODY MASS INDEX: 33.92 KG/M2 | DIASTOLIC BLOOD PRESSURE: 60 MMHG | SYSTOLIC BLOOD PRESSURE: 104 MMHG | WEIGHT: 198.69 LBS

## 2022-04-05 DIAGNOSIS — E44.1 MILD PROTEIN-CALORIE MALNUTRITION (HCC): Primary | ICD-10-CM

## 2022-04-05 DIAGNOSIS — E55.9 VITAMIN D DEFICIENCY: ICD-10-CM

## 2022-04-05 DIAGNOSIS — F43.9 STRESS: ICD-10-CM

## 2022-04-05 DIAGNOSIS — E51.9 THIAMINE DEFICIENCY: ICD-10-CM

## 2022-04-05 DIAGNOSIS — Z51.81 ENCOUNTER FOR THERAPEUTIC DRUG MONITORING: ICD-10-CM

## 2022-04-05 DIAGNOSIS — Z98.890 S/P GASTROPLASTY: ICD-10-CM

## 2022-04-05 DIAGNOSIS — E66.9 OBESITY (BMI 30-39.9): ICD-10-CM

## 2022-04-05 PROCEDURE — 3078F DIAST BP <80 MM HG: CPT | Performed by: INTERNAL MEDICINE

## 2022-04-05 PROCEDURE — 3008F BODY MASS INDEX DOCD: CPT | Performed by: INTERNAL MEDICINE

## 2022-04-05 PROCEDURE — 3074F SYST BP LT 130 MM HG: CPT | Performed by: INTERNAL MEDICINE

## 2022-04-05 PROCEDURE — 99214 OFFICE O/P EST MOD 30 MIN: CPT | Performed by: INTERNAL MEDICINE

## 2022-04-05 RX ORDER — FLUOXETINE HYDROCHLORIDE 20 MG/1
20 CAPSULE ORAL DAILY
COMMUNITY
Start: 2022-03-04

## 2022-04-05 RX ORDER — PHENTERMINE HYDROCHLORIDE 37.5 MG/1
37.5 TABLET ORAL
Qty: 30 TABLET | Refills: 2 | Status: SHIPPED | OUTPATIENT
Start: 2022-04-05

## 2022-04-05 RX ORDER — BUSPIRONE HYDROCHLORIDE 10 MG/1
10 TABLET ORAL 2 TIMES DAILY
COMMUNITY
Start: 2022-02-26

## 2022-04-15 RX ORDER — NYSTATIN 100000 [USP'U]/G
POWDER TOPICAL
Qty: 30 G | Refills: 2 | Status: SHIPPED | OUTPATIENT
Start: 2022-04-15

## 2022-05-17 ENCOUNTER — TELEPHONE (OUTPATIENT)
Dept: SURGERY | Facility: CLINIC | Age: 52
End: 2022-05-17

## 2022-05-17 NOTE — TELEPHONE ENCOUNTER
Spoke with pt to remind to schedule dietitian annual follow up visit. Pt declined to schedule due to h/o canceled appoints.

## 2022-05-27 ENCOUNTER — LAB ENCOUNTER (OUTPATIENT)
Dept: LAB | Facility: HOSPITAL | Age: 52
End: 2022-05-27
Attending: INTERNAL MEDICINE
Payer: COMMERCIAL

## 2022-05-27 ENCOUNTER — TELEPHONE (OUTPATIENT)
Dept: SURGERY | Facility: CLINIC | Age: 52
End: 2022-05-27

## 2022-05-27 DIAGNOSIS — Z98.890 S/P GASTROPLASTY: ICD-10-CM

## 2022-05-27 DIAGNOSIS — E44.1 MILD PROTEIN-CALORIE MALNUTRITION (HCC): Primary | ICD-10-CM

## 2022-05-27 DIAGNOSIS — F43.9 STRESS: ICD-10-CM

## 2022-05-27 DIAGNOSIS — R60.0 LOWER EXTREMITY EDEMA: ICD-10-CM

## 2022-05-27 DIAGNOSIS — E66.9 OBESITY (BMI 30-39.9): ICD-10-CM

## 2022-05-27 DIAGNOSIS — Z51.81 ENCOUNTER FOR THERAPEUTIC DRUG MONITORING: ICD-10-CM

## 2022-05-27 DIAGNOSIS — E55.9 VITAMIN D DEFICIENCY: ICD-10-CM

## 2022-05-27 DIAGNOSIS — E51.9 THIAMINE DEFICIENCY: ICD-10-CM

## 2022-05-27 DIAGNOSIS — E44.1 MILD PROTEIN-CALORIE MALNUTRITION (HCC): ICD-10-CM

## 2022-05-27 LAB
ALBUMIN SERPL-MCNC: 3.8 G/DL (ref 3.4–5)
ALBUMIN/GLOB SERPL: 1 {RATIO} (ref 1–2)
ALP LIVER SERPL-CCNC: 116 U/L
ALT SERPL-CCNC: 31 U/L
ANION GAP SERPL CALC-SCNC: 5 MMOL/L (ref 0–18)
AST SERPL-CCNC: 19 U/L (ref 15–37)
BILIRUB SERPL-MCNC: 0.5 MG/DL (ref 0.1–2)
BUN BLD-MCNC: 16 MG/DL (ref 7–18)
BUN/CREAT SERPL: 15.1 (ref 10–20)
CALCIUM BLD-MCNC: 9.3 MG/DL (ref 8.5–10.1)
CHLORIDE SERPL-SCNC: 110 MMOL/L (ref 98–112)
CHOLEST SERPL-MCNC: 121 MG/DL (ref ?–200)
CO2 SERPL-SCNC: 27 MMOL/L (ref 21–32)
CREAT BLD-MCNC: 1.06 MG/DL
DEPRECATED RDW RBC AUTO: 46.4 FL (ref 35.1–46.3)
ERYTHROCYTE [DISTWIDTH] IN BLOOD BY AUTOMATED COUNT: 13.8 % (ref 11–15)
FASTING PATIENT LIPID ANSWER: YES
FASTING STATUS PATIENT QL REPORTED: YES
FOLATE SERPL-MCNC: >20 NG/ML (ref 8.7–?)
GLOBULIN PLAS-MCNC: 3.9 G/DL (ref 2.8–4.4)
GLUCOSE BLD-MCNC: 88 MG/DL (ref 70–99)
HCT VFR BLD AUTO: 41.3 %
HDLC SERPL-MCNC: 45 MG/DL (ref 40–59)
HGB BLD-MCNC: 13.5 G/DL
LDLC SERPL CALC-MCNC: 61 MG/DL (ref ?–100)
MCH RBC QN AUTO: 30 PG (ref 26–34)
MCHC RBC AUTO-ENTMCNC: 32.7 G/DL (ref 31–37)
MCV RBC AUTO: 91.8 FL
NONHDLC SERPL-MCNC: 76 MG/DL (ref ?–130)
OSMOLALITY SERPL CALC.SUM OF ELEC: 295 MOSM/KG (ref 275–295)
PLATELET # BLD AUTO: 151 10(3)UL (ref 150–450)
POTASSIUM SERPL-SCNC: 3.7 MMOL/L (ref 3.5–5.1)
PROT SERPL-MCNC: 7.7 G/DL (ref 6.4–8.2)
RBC # BLD AUTO: 4.5 X10(6)UL
SODIUM SERPL-SCNC: 142 MMOL/L (ref 136–145)
TRIGL SERPL-MCNC: 74 MG/DL (ref 30–149)
TSI SER-ACNC: 0.75 MIU/ML (ref 0.36–3.74)
VIT B12 SERPL-MCNC: 1672 PG/ML (ref 193–986)
VIT D+METAB SERPL-MCNC: 45.8 NG/ML (ref 30–100)
VLDLC SERPL CALC-MCNC: 11 MG/DL (ref 0–30)
WBC # BLD AUTO: 5.1 X10(3) UL (ref 4–11)

## 2022-05-27 PROCEDURE — 93010 ELECTROCARDIOGRAM REPORT: CPT | Performed by: INTERNAL MEDICINE

## 2022-05-27 PROCEDURE — 82306 VITAMIN D 25 HYDROXY: CPT

## 2022-05-27 PROCEDURE — 36415 COLL VENOUS BLD VENIPUNCTURE: CPT

## 2022-05-27 PROCEDURE — 82607 VITAMIN B-12: CPT

## 2022-05-27 PROCEDURE — 84425 ASSAY OF VITAMIN B-1: CPT

## 2022-05-27 PROCEDURE — 93005 ELECTROCARDIOGRAM TRACING: CPT

## 2022-05-27 PROCEDURE — 82746 ASSAY OF FOLIC ACID SERUM: CPT

## 2022-05-27 PROCEDURE — 80061 LIPID PANEL: CPT

## 2022-05-27 PROCEDURE — 82397 CHEMILUMINESCENT ASSAY: CPT

## 2022-05-27 PROCEDURE — 84443 ASSAY THYROID STIM HORMONE: CPT

## 2022-05-27 PROCEDURE — 85027 COMPLETE CBC AUTOMATED: CPT

## 2022-05-27 PROCEDURE — 80053 COMPREHEN METABOLIC PANEL: CPT

## 2022-05-27 NOTE — TELEPHONE ENCOUNTER
PATIENT IS REQUESTING AN ORDER FOR ALL LABS, SHE HAS A FRACTURE IN HER LEG AND IT'S DIFFICULT FOR HER TO GET AROUND. THANK YOU.

## 2022-05-30 LAB — LEPTIN: 30.7 NG/ML

## 2022-05-31 LAB — VITAMIN B1 (THIAMINE), WHOLE B: 241 NMOL/L

## 2022-06-27 ENCOUNTER — OFFICE VISIT (OUTPATIENT)
Dept: SURGERY | Facility: CLINIC | Age: 52
End: 2022-06-27
Payer: COMMERCIAL

## 2022-06-27 VITALS
OXYGEN SATURATION: 95 % | HEART RATE: 90 BPM | WEIGHT: 196 LBS | DIASTOLIC BLOOD PRESSURE: 80 MMHG | SYSTOLIC BLOOD PRESSURE: 118 MMHG | BODY MASS INDEX: 33.46 KG/M2 | HEIGHT: 64 IN

## 2022-06-27 DIAGNOSIS — Z98.890 S/P GASTROPLASTY: ICD-10-CM

## 2022-06-27 DIAGNOSIS — F43.9 STRESS: ICD-10-CM

## 2022-06-27 DIAGNOSIS — L30.4 INTERTRIGO: ICD-10-CM

## 2022-06-27 DIAGNOSIS — E44.1 MILD PROTEIN-CALORIE MALNUTRITION (HCC): Primary | ICD-10-CM

## 2022-06-27 DIAGNOSIS — Z51.81 ENCOUNTER FOR THERAPEUTIC DRUG MONITORING: ICD-10-CM

## 2022-06-27 DIAGNOSIS — E66.9 OBESITY (BMI 30-39.9): ICD-10-CM

## 2022-06-27 PROCEDURE — 3079F DIAST BP 80-89 MM HG: CPT | Performed by: INTERNAL MEDICINE

## 2022-06-27 PROCEDURE — 3008F BODY MASS INDEX DOCD: CPT | Performed by: INTERNAL MEDICINE

## 2022-06-27 PROCEDURE — 99214 OFFICE O/P EST MOD 30 MIN: CPT | Performed by: INTERNAL MEDICINE

## 2022-06-27 PROCEDURE — 3074F SYST BP LT 130 MM HG: CPT | Performed by: INTERNAL MEDICINE

## 2022-06-27 RX ORDER — TOPIRAMATE 50 MG/1
50 TABLET, FILM COATED ORAL 2 TIMES DAILY
Qty: 180 TABLET | Refills: 1 | Status: SHIPPED | OUTPATIENT
Start: 2022-06-27 | End: 2022-09-25

## 2022-06-27 RX ORDER — PHENTERMINE HYDROCHLORIDE 37.5 MG/1
37.5 TABLET ORAL
Qty: 30 TABLET | Refills: 2 | Status: SHIPPED | OUTPATIENT
Start: 2022-06-27

## 2022-07-18 RX ORDER — PANTOPRAZOLE SODIUM 20 MG/1
TABLET, DELAYED RELEASE ORAL
Qty: 90 TABLET | Refills: 0 | Status: SHIPPED | OUTPATIENT
Start: 2022-07-18

## 2022-10-05 DIAGNOSIS — L30.4 INTERTRIGO: ICD-10-CM

## 2022-10-05 RX ORDER — BUPROPION HYDROCHLORIDE 300 MG/1
TABLET ORAL
Qty: 90 TABLET | Refills: 0 | Status: SHIPPED | OUTPATIENT
Start: 2022-10-05

## 2022-10-05 RX ORDER — PANTOPRAZOLE SODIUM 20 MG/1
TABLET, DELAYED RELEASE ORAL
Qty: 90 TABLET | Refills: 0 | Status: SHIPPED | OUTPATIENT
Start: 2022-10-05

## 2022-10-05 RX ORDER — NYSTATIN 100000 [USP'U]/G
POWDER TOPICAL
Qty: 30 G | Refills: 2 | Status: SHIPPED | OUTPATIENT
Start: 2022-10-05

## 2022-10-26 ENCOUNTER — OFFICE VISIT (OUTPATIENT)
Dept: SURGERY | Facility: CLINIC | Age: 52
End: 2022-10-26
Payer: COMMERCIAL

## 2022-10-26 VITALS
DIASTOLIC BLOOD PRESSURE: 58 MMHG | WEIGHT: 196.63 LBS | SYSTOLIC BLOOD PRESSURE: 106 MMHG | HEIGHT: 64 IN | BODY MASS INDEX: 33.57 KG/M2 | HEART RATE: 70 BPM | OXYGEN SATURATION: 98 %

## 2022-10-26 DIAGNOSIS — Z98.890 S/P GASTROPLASTY: ICD-10-CM

## 2022-10-26 DIAGNOSIS — Z51.81 ENCOUNTER FOR THERAPEUTIC DRUG MONITORING: ICD-10-CM

## 2022-10-26 DIAGNOSIS — F43.9 STRESS: ICD-10-CM

## 2022-10-26 DIAGNOSIS — E44.1 MILD PROTEIN-CALORIE MALNUTRITION (HCC): Primary | ICD-10-CM

## 2022-10-26 DIAGNOSIS — E66.9 OBESITY (BMI 30-39.9): ICD-10-CM

## 2022-10-26 PROCEDURE — 3078F DIAST BP <80 MM HG: CPT | Performed by: INTERNAL MEDICINE

## 2022-10-26 PROCEDURE — 3008F BODY MASS INDEX DOCD: CPT | Performed by: INTERNAL MEDICINE

## 2022-10-26 PROCEDURE — 3074F SYST BP LT 130 MM HG: CPT | Performed by: INTERNAL MEDICINE

## 2022-10-26 PROCEDURE — 99214 OFFICE O/P EST MOD 30 MIN: CPT | Performed by: INTERNAL MEDICINE

## 2022-10-26 RX ORDER — PHENTERMINE HYDROCHLORIDE 37.5 MG/1
37.5 TABLET ORAL
Qty: 30 TABLET | Refills: 2 | Status: SHIPPED | OUTPATIENT
Start: 2022-10-26

## 2022-12-12 RX ORDER — PANTOPRAZOLE SODIUM 20 MG/1
TABLET, DELAYED RELEASE ORAL
Qty: 90 TABLET | Refills: 0 | Status: SHIPPED | OUTPATIENT
Start: 2022-12-12

## 2023-01-03 RX ORDER — BUPROPION HYDROCHLORIDE 300 MG/1
TABLET ORAL
Qty: 90 TABLET | Refills: 0 | Status: SHIPPED | OUTPATIENT
Start: 2023-01-03

## 2023-01-03 RX ORDER — TOPIRAMATE 50 MG/1
TABLET, FILM COATED ORAL
Qty: 180 TABLET | Refills: 1 | Status: SHIPPED | OUTPATIENT
Start: 2023-01-03

## 2023-02-02 ENCOUNTER — OFFICE VISIT (OUTPATIENT)
Dept: SURGERY | Facility: CLINIC | Age: 53
End: 2023-02-02
Payer: COMMERCIAL

## 2023-02-02 VITALS
HEART RATE: 81 BPM | SYSTOLIC BLOOD PRESSURE: 128 MMHG | OXYGEN SATURATION: 97 % | WEIGHT: 199 LBS | HEIGHT: 64 IN | BODY MASS INDEX: 33.97 KG/M2 | DIASTOLIC BLOOD PRESSURE: 82 MMHG

## 2023-02-02 DIAGNOSIS — E44.1 MILD PROTEIN-CALORIE MALNUTRITION (HCC): Primary | ICD-10-CM

## 2023-02-02 DIAGNOSIS — Z51.81 ENCOUNTER FOR THERAPEUTIC DRUG MONITORING: ICD-10-CM

## 2023-02-02 DIAGNOSIS — Z98.890 S/P GASTROPLASTY: ICD-10-CM

## 2023-02-02 DIAGNOSIS — E51.9 THIAMINE DEFICIENCY: ICD-10-CM

## 2023-02-02 DIAGNOSIS — E55.9 VITAMIN D DEFICIENCY: ICD-10-CM

## 2023-02-02 DIAGNOSIS — E66.9 OBESITY (BMI 30-39.9): ICD-10-CM

## 2023-02-02 DIAGNOSIS — F43.9 STRESS: ICD-10-CM

## 2023-02-02 PROCEDURE — 3074F SYST BP LT 130 MM HG: CPT | Performed by: INTERNAL MEDICINE

## 2023-02-02 PROCEDURE — 3079F DIAST BP 80-89 MM HG: CPT | Performed by: INTERNAL MEDICINE

## 2023-02-02 PROCEDURE — 3008F BODY MASS INDEX DOCD: CPT | Performed by: INTERNAL MEDICINE

## 2023-02-02 PROCEDURE — 99214 OFFICE O/P EST MOD 30 MIN: CPT | Performed by: INTERNAL MEDICINE

## 2023-02-02 RX ORDER — PHENTERMINE HYDROCHLORIDE 37.5 MG/1
37.5 TABLET ORAL
Qty: 30 TABLET | Refills: 2 | Status: SHIPPED | OUTPATIENT
Start: 2023-03-02

## 2023-03-25 DIAGNOSIS — L30.4 INTERTRIGO: ICD-10-CM

## 2023-03-27 RX ORDER — NYSTATIN 100000 [USP'U]/G
POWDER TOPICAL
Qty: 30 G | Refills: 2 | Status: SHIPPED | OUTPATIENT
Start: 2023-03-27

## 2023-03-27 RX ORDER — BUPROPION HYDROCHLORIDE 300 MG/1
TABLET ORAL
Qty: 90 TABLET | Refills: 0 | Status: SHIPPED | OUTPATIENT
Start: 2023-03-27

## 2023-04-25 RX ORDER — BUPROPION HYDROCHLORIDE 300 MG/1
TABLET ORAL
Qty: 90 TABLET | Refills: 0 | Status: SHIPPED | OUTPATIENT
Start: 2023-04-25

## 2023-05-12 RX ORDER — TOPIRAMATE 50 MG/1
TABLET, FILM COATED ORAL
Qty: 180 TABLET | Refills: 1 | Status: SHIPPED | OUTPATIENT
Start: 2023-05-12

## 2023-05-23 DIAGNOSIS — L30.4 INTERTRIGO: ICD-10-CM

## 2023-05-23 RX ORDER — NYSTATIN 100000 [USP'U]/G
POWDER TOPICAL
Qty: 30 G | Refills: 2 | Status: SHIPPED | OUTPATIENT
Start: 2023-05-23

## 2023-06-07 ENCOUNTER — OFFICE VISIT (OUTPATIENT)
Dept: SURGERY | Facility: CLINIC | Age: 53
End: 2023-06-07
Payer: COMMERCIAL

## 2023-06-07 VITALS
SYSTOLIC BLOOD PRESSURE: 118 MMHG | HEART RATE: 91 BPM | OXYGEN SATURATION: 96 % | BODY MASS INDEX: 35.17 KG/M2 | WEIGHT: 206 LBS | DIASTOLIC BLOOD PRESSURE: 80 MMHG | HEIGHT: 64 IN

## 2023-06-07 DIAGNOSIS — Z98.890 S/P GASTROPLASTY: ICD-10-CM

## 2023-06-07 DIAGNOSIS — E66.9 OBESITY (BMI 30-39.9): ICD-10-CM

## 2023-06-07 DIAGNOSIS — E55.9 VITAMIN D DEFICIENCY: ICD-10-CM

## 2023-06-07 DIAGNOSIS — F43.9 STRESS: ICD-10-CM

## 2023-06-07 DIAGNOSIS — E44.1 MILD PROTEIN-CALORIE MALNUTRITION (HCC): Primary | ICD-10-CM

## 2023-06-07 DIAGNOSIS — Z51.81 ENCOUNTER FOR THERAPEUTIC DRUG MONITORING: ICD-10-CM

## 2023-06-07 PROCEDURE — 3079F DIAST BP 80-89 MM HG: CPT | Performed by: INTERNAL MEDICINE

## 2023-06-07 PROCEDURE — 99214 OFFICE O/P EST MOD 30 MIN: CPT | Performed by: INTERNAL MEDICINE

## 2023-06-07 PROCEDURE — 3008F BODY MASS INDEX DOCD: CPT | Performed by: INTERNAL MEDICINE

## 2023-06-07 PROCEDURE — 3074F SYST BP LT 130 MM HG: CPT | Performed by: INTERNAL MEDICINE

## 2023-06-07 RX ORDER — PHENTERMINE HYDROCHLORIDE 37.5 MG/1
37.5 TABLET ORAL
Qty: 30 TABLET | Refills: 2 | Status: SHIPPED | OUTPATIENT
Start: 2023-06-07

## 2023-06-19 RX ORDER — BUPROPION HYDROCHLORIDE 300 MG/1
300 TABLET ORAL EVERY EVENING
Qty: 90 TABLET | Refills: 0 | Status: SHIPPED | OUTPATIENT
Start: 2023-06-19

## 2023-06-19 RX ORDER — PANTOPRAZOLE SODIUM 20 MG/1
20 TABLET, DELAYED RELEASE ORAL
Qty: 90 TABLET | Refills: 0 | Status: SHIPPED | OUTPATIENT
Start: 2023-06-19

## 2023-06-23 ENCOUNTER — HOSPITAL ENCOUNTER (OUTPATIENT)
Dept: GENERAL RADIOLOGY | Facility: HOSPITAL | Age: 53
Discharge: HOME | End: 2023-06-23
Attending: ORTHOPAEDIC SURGERY
Payer: COMMERCIAL

## 2023-06-23 ENCOUNTER — LAB ENCOUNTER (OUTPATIENT)
Dept: LAB | Facility: HOSPITAL | Age: 53
End: 2023-06-23
Attending: INTERNAL MEDICINE
Payer: COMMERCIAL

## 2023-06-23 DIAGNOSIS — M54.50 LUMBAGO: ICD-10-CM

## 2023-06-23 DIAGNOSIS — M54.16 LUMBAR RADICULOPATHY: ICD-10-CM

## 2023-06-23 DIAGNOSIS — E55.9 VITAMIN D DEFICIENCY: ICD-10-CM

## 2023-06-23 DIAGNOSIS — Z51.81 ENCOUNTER FOR THERAPEUTIC DRUG MONITORING: ICD-10-CM

## 2023-06-23 DIAGNOSIS — Z01.818 PREOPERATIVE EXAMINATION, UNSPECIFIED: ICD-10-CM

## 2023-06-23 DIAGNOSIS — M43.16 SPONDYLOLISTHESIS OF LUMBAR REGION: ICD-10-CM

## 2023-06-23 DIAGNOSIS — Z98.890 S/P GASTROPLASTY: ICD-10-CM

## 2023-06-23 DIAGNOSIS — F43.9 STRESS: ICD-10-CM

## 2023-06-23 DIAGNOSIS — M54.16 LUMBAR RADICULOPATHY: Primary | ICD-10-CM

## 2023-06-23 DIAGNOSIS — E51.9 THIAMINE DEFICIENCY: ICD-10-CM

## 2023-06-23 DIAGNOSIS — E66.9 OBESITY (BMI 30-39.9): ICD-10-CM

## 2023-06-23 DIAGNOSIS — E44.1 MILD PROTEIN-CALORIE MALNUTRITION (HCC): ICD-10-CM

## 2023-06-23 LAB
ALBUMIN SERPL-MCNC: 3.2 G/DL (ref 3.4–5)
ALBUMIN/GLOB SERPL: 0.8 {RATIO} (ref 1–2)
ALP LIVER SERPL-CCNC: 110 U/L
ALT SERPL-CCNC: 33 U/L
ANION GAP SERPL CALC-SCNC: 6 MMOL/L (ref 0–18)
APTT PPP: 28.4 SECONDS (ref 23.3–35.6)
AST SERPL-CCNC: 19 U/L (ref 15–37)
ATRIAL RATE: 74 BPM
BASOPHILS # BLD AUTO: 0.04 X10(3) UL (ref 0–0.2)
BASOPHILS NFR BLD AUTO: 0.8 %
BILIRUB SERPL-MCNC: 0.5 MG/DL (ref 0.1–2)
BILIRUB UR QL: NEGATIVE
BUN BLD-MCNC: 15 MG/DL (ref 7–18)
BUN/CREAT SERPL: 14.7 (ref 10–20)
CALCIUM BLD-MCNC: 9.3 MG/DL (ref 8.5–10.1)
CHLORIDE SERPL-SCNC: 113 MMOL/L (ref 98–112)
CHOLEST SERPL-MCNC: 137 MG/DL (ref ?–200)
CLARITY UR: CLEAR
CO2 SERPL-SCNC: 24 MMOL/L (ref 21–32)
CREAT BLD-MCNC: 1.02 MG/DL
DEPRECATED RDW RBC AUTO: 44.1 FL (ref 35.1–46.3)
EOSINOPHIL # BLD AUTO: 0.16 X10(3) UL (ref 0–0.7)
EOSINOPHIL NFR BLD AUTO: 3.1 %
ERYTHROCYTE [DISTWIDTH] IN BLOOD BY AUTOMATED COUNT: 15.2 % (ref 11–15)
EST. AVERAGE GLUCOSE BLD GHB EST-MCNC: 103 MG/DL (ref 68–126)
FASTING PATIENT LIPID ANSWER: YES
FASTING STATUS PATIENT QL REPORTED: YES
FOLATE SERPL-MCNC: 7.7 NG/ML (ref 8.7–?)
GFR SERPLBLD BASED ON 1.73 SQ M-ARVRAT: 66 ML/MIN/1.73M2 (ref 60–?)
GLOBULIN PLAS-MCNC: 3.8 G/DL (ref 2.8–4.4)
GLUCOSE BLD-MCNC: 86 MG/DL (ref 70–99)
GLUCOSE UR-MCNC: NORMAL MG/DL
HBA1C MFR BLD: 5.2 % (ref ?–5.7)
HCT VFR BLD AUTO: 35.8 %
HDLC SERPL-MCNC: 48 MG/DL (ref 40–59)
HGB BLD-MCNC: 10.9 G/DL
HGB UR QL STRIP.AUTO: NEGATIVE
HYALINE CASTS #/AREA URNS AUTO: PRESENT /LPF
IMM GRANULOCYTES # BLD AUTO: 0.01 X10(3) UL (ref 0–1)
IMM GRANULOCYTES NFR BLD: 0.2 %
INR BLD: 1.12 (ref 0.85–1.16)
KETONES UR-MCNC: NEGATIVE MG/DL
LDLC SERPL CALC-MCNC: 76 MG/DL (ref ?–100)
LEUKOCYTE ESTERASE UR QL STRIP.AUTO: 75
LYMPHOCYTES # BLD AUTO: 1.36 X10(3) UL (ref 1–4)
LYMPHOCYTES NFR BLD AUTO: 26.5 %
MCH RBC QN AUTO: 24.4 PG (ref 26–34)
MCHC RBC AUTO-ENTMCNC: 30.4 G/DL (ref 31–37)
MCV RBC AUTO: 80.3 FL
MONOCYTES # BLD AUTO: 0.38 X10(3) UL (ref 0.1–1)
MONOCYTES NFR BLD AUTO: 7.4 %
MRSA DNA SPEC QL NAA+PROBE: NEGATIVE
NEUTROPHILS # BLD AUTO: 3.19 X10 (3) UL (ref 1.5–7.7)
NEUTROPHILS # BLD AUTO: 3.19 X10(3) UL (ref 1.5–7.7)
NEUTROPHILS NFR BLD AUTO: 62 %
NITRITE UR QL STRIP.AUTO: NEGATIVE
NONHDLC SERPL-MCNC: 89 MG/DL (ref ?–130)
OSMOLALITY SERPL CALC.SUM OF ELEC: 296 MOSM/KG (ref 275–295)
P AXIS: 17 DEGREES
P-R INTERVAL: 148 MS
PH UR: 5.5 [PH] (ref 5–8)
PLATELET # BLD AUTO: 174 10(3)UL (ref 150–450)
POTASSIUM SERPL-SCNC: 3.6 MMOL/L (ref 3.5–5.1)
PROT SERPL-MCNC: 7 G/DL (ref 6.4–8.2)
PROT UR-MCNC: NEGATIVE MG/DL
PROTHROMBIN TIME: 14.3 SECONDS (ref 11.6–14.8)
Q-T INTERVAL: 360 MS
QRS DURATION: 82 MS
QTC CALCULATION (BEZET): 399 MS
R AXIS: 0 DEGREES
RBC # BLD AUTO: 4.46 X10(6)UL
SODIUM SERPL-SCNC: 143 MMOL/L (ref 136–145)
SP GR UR STRIP: 1.02 (ref 1–1.03)
T AXIS: 9 DEGREES
TRIGL SERPL-MCNC: 65 MG/DL (ref 30–149)
TSI SER-ACNC: 0.5 MIU/ML (ref 0.36–3.74)
UROBILINOGEN UR STRIP-ACNC: NORMAL
VENTRICULAR RATE: 74 BPM
VIT B12 SERPL-MCNC: 701 PG/ML (ref 193–986)
VIT D+METAB SERPL-MCNC: 38.2 NG/ML (ref 30–100)
VLDLC SERPL CALC-MCNC: 10 MG/DL (ref 0–30)
WBC # BLD AUTO: 5.1 X10(3) UL (ref 4–11)

## 2023-06-23 PROCEDURE — 80053 COMPREHEN METABOLIC PANEL: CPT | Performed by: INTERNAL MEDICINE

## 2023-06-23 PROCEDURE — 84425 ASSAY OF VITAMIN B-1: CPT | Performed by: INTERNAL MEDICINE

## 2023-06-23 PROCEDURE — 80061 LIPID PANEL: CPT | Performed by: INTERNAL MEDICINE

## 2023-06-23 PROCEDURE — 84443 ASSAY THYROID STIM HORMONE: CPT | Performed by: INTERNAL MEDICINE

## 2023-06-23 PROCEDURE — 71046 X-RAY EXAM CHEST 2 VIEWS: CPT | Performed by: ORTHOPAEDIC SURGERY

## 2023-06-23 PROCEDURE — 82746 ASSAY OF FOLIC ACID SERUM: CPT | Performed by: INTERNAL MEDICINE

## 2023-06-23 PROCEDURE — 82607 VITAMIN B-12: CPT | Performed by: INTERNAL MEDICINE

## 2023-06-23 PROCEDURE — 82306 VITAMIN D 25 HYDROXY: CPT | Performed by: INTERNAL MEDICINE

## 2023-06-27 LAB — VITAMIN B1 WHOLE BLD: 114.5 NMOL/L

## 2023-07-23 DIAGNOSIS — L30.4 INTERTRIGO: ICD-10-CM

## 2023-07-24 RX ORDER — NYSTATIN 100000 [USP'U]/G
POWDER TOPICAL
Qty: 30 G | Refills: 2 | Status: SHIPPED | OUTPATIENT
Start: 2023-07-24

## 2023-07-26 ENCOUNTER — LAB ENCOUNTER (OUTPATIENT)
Dept: LAB | Age: 53
End: 2023-07-26
Attending: ORTHOPAEDIC SURGERY
Payer: COMMERCIAL

## 2023-07-26 DIAGNOSIS — Z01.818 PREOP TESTING: ICD-10-CM

## 2023-07-26 LAB
ANTIBODY SCREEN: NEGATIVE
RH BLOOD TYPE: POSITIVE

## 2023-07-26 PROCEDURE — 86901 BLOOD TYPING SEROLOGIC RH(D): CPT

## 2023-07-26 PROCEDURE — 86850 RBC ANTIBODY SCREEN: CPT

## 2023-07-26 PROCEDURE — 86900 BLOOD TYPING SEROLOGIC ABO: CPT

## 2023-07-26 PROCEDURE — 36415 COLL VENOUS BLD VENIPUNCTURE: CPT

## 2023-08-01 ENCOUNTER — HOSPITAL ENCOUNTER (INPATIENT)
Facility: HOSPITAL | Age: 53
LOS: 7 days | Discharge: HOME | End: 2023-08-08
Attending: ORTHOPAEDIC SURGERY | Admitting: ORTHOPAEDIC SURGERY
Payer: COMMERCIAL

## 2023-08-01 ENCOUNTER — ANESTHESIA EVENT (OUTPATIENT)
Dept: SURGERY | Facility: HOSPITAL | Age: 53
End: 2023-08-01
Payer: COMMERCIAL

## 2023-08-01 ENCOUNTER — APPOINTMENT (OUTPATIENT)
Dept: GENERAL RADIOLOGY | Facility: HOSPITAL | Age: 53
End: 2023-08-01
Attending: ORTHOPAEDIC SURGERY
Payer: COMMERCIAL

## 2023-08-01 ENCOUNTER — ANESTHESIA (OUTPATIENT)
Dept: SURGERY | Facility: HOSPITAL | Age: 53
End: 2023-08-01
Payer: COMMERCIAL

## 2023-08-01 DIAGNOSIS — Z01.818 PREOP TESTING: Primary | ICD-10-CM

## 2023-08-01 PROBLEM — M54.16 LUMBAR RADICULOPATHY: Status: ACTIVE | Noted: 2023-08-01

## 2023-08-01 LAB
ATRIAL RATE: 52 BPM
P AXIS: 32 DEGREES
P-R INTERVAL: 142 MS
Q-T INTERVAL: 470 MS
QRS DURATION: 84 MS
QTC CALCULATION (BEZET): 437 MS
R AXIS: 6 DEGREES
T AXIS: 7 DEGREES
VENTRICULAR RATE: 52 BPM

## 2023-08-01 PROCEDURE — 50715 RELEASE OF URETER: CPT | Performed by: SURGERY

## 2023-08-01 PROCEDURE — 4A11X4G MONITORING OF PERIPHERAL NERVOUS ELECTRICAL ACTIVITY, INTRAOPERATIVE, EXTERNAL APPROACH: ICD-10-PCS | Performed by: ORTHOPAEDIC SURGERY

## 2023-08-01 PROCEDURE — 0SG30K1 FUSION OF LUMBOSACRAL JOINT WITH NONAUTOLOGOUS TISSUE SUBSTITUTE, POSTERIOR APPROACH, POSTERIOR COLUMN, OPEN APPROACH: ICD-10-PCS | Performed by: ORTHOPAEDIC SURGERY

## 2023-08-01 PROCEDURE — 22853 INSJ BIOMECHANICAL DEVICE: CPT | Performed by: SURGERY

## 2023-08-01 PROCEDURE — 76000 FLUOROSCOPY <1 HR PHYS/QHP: CPT | Performed by: ORTHOPAEDIC SURGERY

## 2023-08-01 PROCEDURE — 0ST40ZZ RESECTION OF LUMBOSACRAL DISC, OPEN APPROACH: ICD-10-PCS | Performed by: ORTHOPAEDIC SURGERY

## 2023-08-01 PROCEDURE — 22558 ARTHRD ANT NTRBD MIN DSC LUM: CPT | Performed by: SURGERY

## 2023-08-01 PROCEDURE — 22845 INSERT SPINE FIXATION DEVICE: CPT | Performed by: SURGERY

## 2023-08-01 PROCEDURE — 3E0U0GB INTRODUCTION OF RECOMBINANT BONE MORPHOGENETIC PROTEIN INTO JOINTS, OPEN APPROACH: ICD-10-PCS | Performed by: ORTHOPAEDIC SURGERY

## 2023-08-01 PROCEDURE — 0TN70ZZ RELEASE LEFT URETER, OPEN APPROACH: ICD-10-PCS | Performed by: SURGERY

## 2023-08-01 PROCEDURE — 0SG30A0 FUSION OF LUMBOSACRAL JOINT WITH INTERBODY FUSION DEVICE, ANTERIOR APPROACH, ANTERIOR COLUMN, OPEN APPROACH: ICD-10-PCS | Performed by: ORTHOPAEDIC SURGERY

## 2023-08-01 DEVICE — RELINE LCK SCRW 5.5 OPEN TULIP: Type: IMPLANTABLE DEVICE | Site: BACK | Status: FUNCTIONAL

## 2023-08-01 DEVICE — CAGE SPINAL 12D 42X30X14MM: Type: IMPLANTABLE DEVICE | Site: BACK | Status: FUNCTIONAL

## 2023-08-01 DEVICE — RELINE MAS TI ROD 5.5X30 LRDTC: Type: IMPLANTABLE DEVICE | Site: BACK | Status: FUNCTIONAL

## 2023-08-01 DEVICE — RELINE MAS RED SCREW 6.5X45 2C: Type: IMPLANTABLE DEVICE | Site: BACK | Status: FUNCTIONAL

## 2023-08-01 DEVICE — BONE GRAFT KIT 7510100 INFUSE X SMALL
Type: IMPLANTABLE DEVICE | Site: BACK | Status: FUNCTIONAL
Brand: INFUSE® BONE GRAFT

## 2023-08-01 DEVICE — RELINE MAS RED SCRW 6.5X50 2C: Type: IMPLANTABLE DEVICE | Site: BACK | Status: FUNCTIONAL

## 2023-08-01 DEVICE — OSTEOCEL PRO LARGE BULK BUY: Type: IMPLANTABLE DEVICE | Site: BACK | Status: FUNCTIONAL

## 2023-08-01 DEVICE — COROENT XLR-F SCREW 5.5X25: Type: IMPLANTABLE DEVICE | Site: BACK | Status: FUNCTIONAL

## 2023-08-01 RX ORDER — BISACODYL 10 MG
10 SUPPOSITORY, RECTAL RECTAL
Status: DISCONTINUED | OUTPATIENT
Start: 2023-08-01 | End: 2023-08-05

## 2023-08-01 RX ORDER — ENEMA 19; 7 G/133ML; G/133ML
1 ENEMA RECTAL ONCE AS NEEDED
Status: DISCONTINUED | OUTPATIENT
Start: 2023-08-01 | End: 2023-08-08

## 2023-08-01 RX ORDER — TOPIRAMATE 25 MG/1
50 TABLET ORAL 2 TIMES DAILY
Status: DISCONTINUED | OUTPATIENT
Start: 2023-08-01 | End: 2023-08-08

## 2023-08-01 RX ORDER — TRANEXAMIC ACID 10 MG/ML
1000 INJECTION, SOLUTION INTRAVENOUS ONCE
Status: DISCONTINUED | OUTPATIENT
Start: 2023-08-01 | End: 2023-08-01 | Stop reason: HOSPADM

## 2023-08-01 RX ORDER — SODIUM CHLORIDE 9 MG/ML
INJECTION, SOLUTION INTRAVENOUS CONTINUOUS
Status: DISCONTINUED | OUTPATIENT
Start: 2023-08-01 | End: 2023-08-08

## 2023-08-01 RX ORDER — HYDROMORPHONE HYDROCHLORIDE 1 MG/ML
0.4 INJECTION, SOLUTION INTRAMUSCULAR; INTRAVENOUS; SUBCUTANEOUS EVERY 5 MIN PRN
Status: DISCONTINUED | OUTPATIENT
Start: 2023-08-01 | End: 2023-08-01 | Stop reason: HOSPADM

## 2023-08-01 RX ORDER — BUSPIRONE HYDROCHLORIDE 5 MG/1
10 TABLET ORAL 2 TIMES DAILY
Status: DISCONTINUED | OUTPATIENT
Start: 2023-08-01 | End: 2023-08-08

## 2023-08-01 RX ORDER — BUPIVACAINE HYDROCHLORIDE AND EPINEPHRINE 5; 5 MG/ML; UG/ML
INJECTION, SOLUTION PERINEURAL AS NEEDED
Status: DISCONTINUED | OUTPATIENT
Start: 2023-08-01 | End: 2023-08-01 | Stop reason: HOSPADM

## 2023-08-01 RX ORDER — SENNOSIDES 8.6 MG
17.2 TABLET ORAL NIGHTLY
Status: DISCONTINUED | OUTPATIENT
Start: 2023-08-01 | End: 2023-08-08

## 2023-08-01 RX ORDER — MORPHINE SULFATE 10 MG/ML
6 INJECTION, SOLUTION INTRAMUSCULAR; INTRAVENOUS EVERY 10 MIN PRN
Status: DISCONTINUED | OUTPATIENT
Start: 2023-08-01 | End: 2023-08-01 | Stop reason: HOSPADM

## 2023-08-01 RX ORDER — OXYCODONE HYDROCHLORIDE AND ACETAMINOPHEN 5; 325 MG/1; MG/1
1 TABLET ORAL EVERY 4 HOURS PRN
Status: DISCONTINUED | OUTPATIENT
Start: 2023-08-01 | End: 2023-08-08

## 2023-08-01 RX ORDER — FAMOTIDINE 20 MG/1
20 TABLET, FILM COATED ORAL ONCE
Status: COMPLETED | OUTPATIENT
Start: 2023-08-01 | End: 2023-08-01

## 2023-08-01 RX ORDER — DIPHENHYDRAMINE HYDROCHLORIDE 50 MG/ML
25 INJECTION INTRAMUSCULAR; INTRAVENOUS EVERY 4 HOURS PRN
Status: DISCONTINUED | OUTPATIENT
Start: 2023-08-01 | End: 2023-08-08

## 2023-08-01 RX ORDER — CYCLOBENZAPRINE HCL 5 MG
5 TABLET ORAL EVERY 6 HOURS PRN
Status: DISCONTINUED | OUTPATIENT
Start: 2023-08-01 | End: 2023-08-08

## 2023-08-01 RX ORDER — ROSUVASTATIN CALCIUM 5 MG/1
5 TABLET, COATED ORAL NIGHTLY
Status: DISCONTINUED | OUTPATIENT
Start: 2023-08-01 | End: 2023-08-08

## 2023-08-01 RX ORDER — DIPHENHYDRAMINE HCL 25 MG
25 CAPSULE ORAL EVERY 4 HOURS PRN
Status: DISCONTINUED | OUTPATIENT
Start: 2023-08-01 | End: 2023-08-08

## 2023-08-01 RX ORDER — DEXAMETHASONE SODIUM PHOSPHATE 4 MG/ML
VIAL (ML) INJECTION AS NEEDED
Status: DISCONTINUED | OUTPATIENT
Start: 2023-08-01 | End: 2023-08-01 | Stop reason: SURG

## 2023-08-01 RX ORDER — OXYCODONE HYDROCHLORIDE AND ACETAMINOPHEN 5; 325 MG/1; MG/1
2 TABLET ORAL EVERY 4 HOURS PRN
Status: DISCONTINUED | OUTPATIENT
Start: 2023-08-01 | End: 2023-08-08

## 2023-08-01 RX ORDER — MIDAZOLAM HYDROCHLORIDE 1 MG/ML
INJECTION INTRAMUSCULAR; INTRAVENOUS AS NEEDED
Status: DISCONTINUED | OUTPATIENT
Start: 2023-08-01 | End: 2023-08-01 | Stop reason: SURG

## 2023-08-01 RX ORDER — ACETAMINOPHEN 500 MG
1000 TABLET ORAL ONCE
Status: COMPLETED | OUTPATIENT
Start: 2023-08-01 | End: 2023-08-01

## 2023-08-01 RX ORDER — KETAMINE HYDROCHLORIDE 50 MG/ML
INJECTION, SOLUTION, CONCENTRATE INTRAMUSCULAR; INTRAVENOUS AS NEEDED
Status: DISCONTINUED | OUTPATIENT
Start: 2023-08-01 | End: 2023-08-01 | Stop reason: SURG

## 2023-08-01 RX ORDER — HYDROMORPHONE HYDROCHLORIDE 1 MG/ML
0.6 INJECTION, SOLUTION INTRAMUSCULAR; INTRAVENOUS; SUBCUTANEOUS EVERY 5 MIN PRN
Status: DISCONTINUED | OUTPATIENT
Start: 2023-08-01 | End: 2023-08-01 | Stop reason: HOSPADM

## 2023-08-01 RX ORDER — MORPHINE SULFATE 4 MG/ML
2 INJECTION, SOLUTION INTRAMUSCULAR; INTRAVENOUS EVERY 10 MIN PRN
Status: DISCONTINUED | OUTPATIENT
Start: 2023-08-01 | End: 2023-08-01 | Stop reason: HOSPADM

## 2023-08-01 RX ORDER — PROCHLORPERAZINE EDISYLATE 5 MG/ML
5 INJECTION INTRAMUSCULAR; INTRAVENOUS EVERY 8 HOURS PRN
Status: DISCONTINUED | OUTPATIENT
Start: 2023-08-01 | End: 2023-08-01 | Stop reason: HOSPADM

## 2023-08-01 RX ORDER — DOCUSATE SODIUM 100 MG/1
100 CAPSULE, LIQUID FILLED ORAL 2 TIMES DAILY
Status: DISCONTINUED | OUTPATIENT
Start: 2023-08-01 | End: 2023-08-08

## 2023-08-01 RX ORDER — HYDROMORPHONE HYDROCHLORIDE 1 MG/ML
0.4 INJECTION, SOLUTION INTRAMUSCULAR; INTRAVENOUS; SUBCUTANEOUS EVERY 2 HOUR PRN
Status: DISCONTINUED | OUTPATIENT
Start: 2023-08-01 | End: 2023-08-08

## 2023-08-01 RX ORDER — SODIUM CHLORIDE, SODIUM LACTATE, POTASSIUM CHLORIDE, CALCIUM CHLORIDE 600; 310; 30; 20 MG/100ML; MG/100ML; MG/100ML; MG/100ML
INJECTION, SOLUTION INTRAVENOUS CONTINUOUS
Status: DISCONTINUED | OUTPATIENT
Start: 2023-08-01 | End: 2023-08-01 | Stop reason: HOSPADM

## 2023-08-01 RX ORDER — CEFAZOLIN SODIUM/WATER 2 G/20 ML
2 SYRINGE (ML) INTRAVENOUS EVERY 8 HOURS
Status: COMPLETED | OUTPATIENT
Start: 2023-08-01 | End: 2023-08-02

## 2023-08-01 RX ORDER — MORPHINE SULFATE 4 MG/ML
4 INJECTION, SOLUTION INTRAMUSCULAR; INTRAVENOUS EVERY 10 MIN PRN
Status: DISCONTINUED | OUTPATIENT
Start: 2023-08-01 | End: 2023-08-01 | Stop reason: HOSPADM

## 2023-08-01 RX ORDER — FLUOXETINE HYDROCHLORIDE 20 MG/1
20 CAPSULE ORAL DAILY
Status: DISCONTINUED | OUTPATIENT
Start: 2023-08-02 | End: 2023-08-08

## 2023-08-01 RX ORDER — BUPROPION HYDROCHLORIDE 300 MG/1
300 TABLET ORAL EVERY EVENING
Status: DISCONTINUED | OUTPATIENT
Start: 2023-08-01 | End: 2023-08-08

## 2023-08-01 RX ORDER — ROCURONIUM BROMIDE 10 MG/ML
INJECTION, SOLUTION INTRAVENOUS AS NEEDED
Status: DISCONTINUED | OUTPATIENT
Start: 2023-08-01 | End: 2023-08-01 | Stop reason: SURG

## 2023-08-01 RX ORDER — HYDROMORPHONE HYDROCHLORIDE 1 MG/ML
0.2 INJECTION, SOLUTION INTRAMUSCULAR; INTRAVENOUS; SUBCUTANEOUS EVERY 5 MIN PRN
Status: DISCONTINUED | OUTPATIENT
Start: 2023-08-01 | End: 2023-08-01 | Stop reason: HOSPADM

## 2023-08-01 RX ORDER — HYDROMORPHONE HYDROCHLORIDE 1 MG/ML
0.6 INJECTION, SOLUTION INTRAMUSCULAR; INTRAVENOUS; SUBCUTANEOUS EVERY 2 HOUR PRN
Status: DISCONTINUED | OUTPATIENT
Start: 2023-08-01 | End: 2023-08-08

## 2023-08-01 RX ORDER — SODIUM CHLORIDE, SODIUM LACTATE, POTASSIUM CHLORIDE, CALCIUM CHLORIDE 600; 310; 30; 20 MG/100ML; MG/100ML; MG/100ML; MG/100ML
INJECTION, SOLUTION INTRAVENOUS CONTINUOUS
Status: DISCONTINUED | OUTPATIENT
Start: 2023-08-01 | End: 2023-08-08

## 2023-08-01 RX ORDER — POLYETHYLENE GLYCOL 3350 17 G/17G
17 POWDER, FOR SOLUTION ORAL DAILY PRN
Status: DISCONTINUED | OUTPATIENT
Start: 2023-08-01 | End: 2023-08-08

## 2023-08-01 RX ORDER — NALOXONE HYDROCHLORIDE 0.4 MG/ML
80 INJECTION, SOLUTION INTRAMUSCULAR; INTRAVENOUS; SUBCUTANEOUS AS NEEDED
Status: DISCONTINUED | OUTPATIENT
Start: 2023-08-01 | End: 2023-08-01 | Stop reason: HOSPADM

## 2023-08-01 RX ORDER — LIDOCAINE HYDROCHLORIDE 10 MG/ML
INJECTION, SOLUTION EPIDURAL; INFILTRATION; INTRACAUDAL; PERINEURAL AS NEEDED
Status: DISCONTINUED | OUTPATIENT
Start: 2023-08-01 | End: 2023-08-01 | Stop reason: SURG

## 2023-08-01 RX ORDER — ONDANSETRON 2 MG/ML
4 INJECTION INTRAMUSCULAR; INTRAVENOUS EVERY 6 HOURS PRN
Status: DISCONTINUED | OUTPATIENT
Start: 2023-08-01 | End: 2023-08-01 | Stop reason: HOSPADM

## 2023-08-01 RX ORDER — PANTOPRAZOLE SODIUM 20 MG/1
20 TABLET, DELAYED RELEASE ORAL
Status: DISCONTINUED | OUTPATIENT
Start: 2023-08-02 | End: 2023-08-08

## 2023-08-01 RX ORDER — ONDANSETRON 2 MG/ML
INJECTION INTRAMUSCULAR; INTRAVENOUS AS NEEDED
Status: DISCONTINUED | OUTPATIENT
Start: 2023-08-01 | End: 2023-08-01 | Stop reason: SURG

## 2023-08-01 RX ORDER — ONDANSETRON 2 MG/ML
4 INJECTION INTRAMUSCULAR; INTRAVENOUS EVERY 6 HOURS PRN
Status: DISCONTINUED | OUTPATIENT
Start: 2023-08-01 | End: 2023-08-08

## 2023-08-01 RX ORDER — CEFAZOLIN SODIUM/WATER 2 G/20 ML
2 SYRINGE (ML) INTRAVENOUS
Status: COMPLETED | OUTPATIENT
Start: 2023-08-01 | End: 2023-08-01

## 2023-08-01 RX ORDER — PROCHLORPERAZINE EDISYLATE 5 MG/ML
5 INJECTION INTRAMUSCULAR; INTRAVENOUS EVERY 8 HOURS PRN
Status: DISCONTINUED | OUTPATIENT
Start: 2023-08-01 | End: 2023-08-05

## 2023-08-01 RX ADMIN — DEXAMETHASONE SODIUM PHOSPHATE 8 MG: 4 MG/ML VIAL (ML) INJECTION at 07:53:00

## 2023-08-01 RX ADMIN — CEFAZOLIN SODIUM/WATER 2 G: 2 G/20 ML SYRINGE (ML) INTRAVENOUS at 07:45:00

## 2023-08-01 RX ADMIN — ROCURONIUM BROMIDE 5 MG: 10 INJECTION, SOLUTION INTRAVENOUS at 07:39:00

## 2023-08-01 RX ADMIN — SODIUM CHLORIDE, SODIUM LACTATE, POTASSIUM CHLORIDE, CALCIUM CHLORIDE: 600; 310; 30; 20 INJECTION, SOLUTION INTRAVENOUS at 07:36:00

## 2023-08-01 RX ADMIN — ONDANSETRON 4 MG: 2 INJECTION INTRAMUSCULAR; INTRAVENOUS at 10:10:00

## 2023-08-01 RX ADMIN — KETAMINE HYDROCHLORIDE 50 MG: 50 INJECTION, SOLUTION, CONCENTRATE INTRAMUSCULAR; INTRAVENOUS at 07:55:00

## 2023-08-01 RX ADMIN — MIDAZOLAM HYDROCHLORIDE 2 MG: 1 INJECTION INTRAMUSCULAR; INTRAVENOUS at 07:36:00

## 2023-08-01 RX ADMIN — LIDOCAINE HYDROCHLORIDE 50 MG: 10 INJECTION, SOLUTION EPIDURAL; INFILTRATION; INTRACAUDAL; PERINEURAL at 07:39:00

## 2023-08-01 RX ADMIN — ROCURONIUM BROMIDE 40 MG: 10 INJECTION, SOLUTION INTRAVENOUS at 07:48:00

## 2023-08-01 RX ADMIN — SODIUM CHLORIDE, SODIUM LACTATE, POTASSIUM CHLORIDE, CALCIUM CHLORIDE: 600; 310; 30; 20 INJECTION, SOLUTION INTRAVENOUS at 10:45:00

## 2023-08-01 NOTE — H&P
History & Physical Examination    Patient Name: Elliot Garcia  MRN: [id number]  [facility]: [id number]  YOB: 1970    Diagnosis: lumbar stenosis, lumbar radiculopathy    Present Illness: Procedure: L5-S1 anterior lumbar interbody fusion with posterior instrumentation, use of allograft bone     pantoprazole 20 MG Oral Tab EC, Take 1 tablet (20 mg total) by mouth before breakfast. (Patient taking differently: Take 1 tablet (20 mg total) by mouth 2 (two) times daily before meals.), Disp: 90 tablet, Rfl: 0, 8/1/2023 at 0000  buPROPion  MG Oral Tablet 24 Hr, Take 1 tablet (300 mg total) by mouth every evening., Disp: 90 tablet, Rfl: 0, 7/31/2023 at 0800  TOPIRAMATE 50 MG Oral Tab, TAKE 1 TABLET(50 MG) BY MOUTH TWICE DAILY, Disp: 180 tablet, Rfl: 1, 8/1/2023 at 0000  busPIRone 10 MG Oral Tab, Take 1 tablet (10 mg total) by mouth 2 (two) times daily. , Disp: , Rfl: , 8/1/2023 at 0000  FLUoxetine 20 MG Oral Cap, Take 1 capsule (20 mg total) by mouth daily. , Disp: , Rfl: , 8/1/2023 at 1499 [facility] (OMEGA-3 OR), , Disp: , Rfl: , Past Month  Multiple Vitamins-Minerals (CELEBRATE MULTI-COMPLETE 18) Oral Cap, Take by mouth daily. , Disp: , Rfl: , 7/31/2023 at 0800  rosuvastatin 5 MG Oral Tab, Take 1 tablet (5 mg total) by mouth nightly., Disp: , Rfl: , 8/1/2023 at 0000  Doxycycline Monohydrate 50 MG Oral Cap, Take 1 capsule (50 mg total) by mouth 2 (two) times daily. , Disp: , Rfl: , 8/1/2023 at 0000  Nystatin (NYSTOP) 548164 UNIT/GM External Powder, APPLY EXTERNALLY TO THE AFFECTED AREA FOUR TIMES DAILY (Patient taking differently: as needed. APPLY EXTERNALLY TO THE AFFECTED AREA FOUR TIMES DAILY), Disp: 30 g, Rfl: 2, More than a month  Phentermine HCl 37.5 MG Oral Tab, Take 1 tablet (37.5 mg total) by mouth before breakfast., Disp: 30 tablet, Rfl: 2, 7/17/2023  calcium carbonate 500 MG Oral Tab, Take by mouth daily. , Disp: , Rfl: , More than a month  Biotin 56530 MCG Oral Tab, Take by mouth daily. , Disp: , Rfl: , More than a month  albuterol 108 (90 Base) MCG/ACT Inhalation Aero Soln, Inhale 2 puffs into the lungs every 4 (four) hours as needed. , Disp: , Rfl: , More than a month  aspirin (ASPIRIN 81) 81 MG Oral Chew Tab, , Disp: , Rfl: , More than a month  Cyanocobalamin (B-12) 250 MCG Oral Tab, Take by mouth daily. , Disp: , Rfl: , More than a month  BELSOMRA 20 MG Oral Tab, Take 1 tablet by mouth nightly as needed. , Disp: , Rfl: , More than a month  clindamycin 1 % External Solution, Apply 1 Application topically As Directed., Disp: , Rfl: , More than a month  Vitamin B-1 100 MG Oral Tab, Take 1 tablet (100 mg total) by mouth daily. , Disp: , Rfl: , More than a month      lactated ringers infusion, , Intravenous, Continuous  metoprolol tartrate (Lopressor) tab 25 mg, 25 mg, Oral, Once PRN  tranexamic acid in sodium chloride 0.7% (Cyklokapron) 1000 mg/100mL infusion premix 1,000 mg, 1,000 mg, Intravenous, Once  ceFAZolin (Ancef) 2 g in 20mL IV syringe premix, 2 g, Intravenous, 30 Min Pre-Op        Allergies: No Known Allergies    Past Medical History:   Diagnosis Date    Alopecia     Anesthesia complication     woke up once during surgery    Anxiety state     Asthma 01/01/2010    hospitalized 2010 for bronchial asthma; seasonal    Back problem     Breast cancer screening [id number]    TOOK TAMOXIFEN FOR 5 YRS R/T POSITIVE GENETIC TESTING FOR BREAST CANCER    Calculus of kidney [id number]    right kidney, passed after lithotripsy    Cyst of ovary 05/01/2011    Depression     no meds at present     Difficult intubation     needs a lighted scope during intubation due to vertical sleeve    Esophageal reflux     High cholesterol 12/01/2016    no meds at present    History of blood transfusion     no reactions    Low HDL (under 40)     Obesity (BMI 30-39. 9)     Osteopenia     Pneumonia due to organism     S/P gastroplasty 12/27/2016    weight loss    Teeth problem 01/01/2016    6 TOP TEETH CAPPED     Past Surgical History:   Procedure Laterality Date    CHOLECYSTECTOMY  05/01/2013    COLONOSCOPY  01/01/2012    HYSTERECTOMY  05/01/2011    LAP SLEEVE GASTRECTOMY  12/27/2016    Dr Matheus Gill Bilateral 09/01/2013    UPPER GI ENDOSCOPY,EXAM       Family History   Problem Relation Age of Onset    Heart Disorder Father     Hypertension Father     Diabetes Father     Heart Disorder Mother     Hypertension Mother     Diabetes Mother     Cancer Mother     Hypertension Sister     Obesity Sister     Diabetes Sister         iddm since 2006     Social History    Tobacco Use      Smoking status: Never      Smokeless tobacco: Never    Alcohol use: No      Alcohol/week: 0.0 standard drinks of alcohol      SYSTEM Check if Review is Normal Check if Physical Exam is Normal If not normal, please explain:   HEENT [X] [ ]    NECK & BACK Taylor.Cole ] [ ]    HEART Taylor.Cole ] [ ]    LUNGS Taylor.Cole ] [ ]    ABDOMEN Taylor.Cole ] [ ]    Cori Dollar Taylor.Cole ] [ ]    EXTREMITIES [ ] [ ]    OTHER        [ x ] I have discussed the risks and benefits and alternatives with the patient/family. They understand and agree to proceed with plan of care. [ x ] I have reviewed the History and Physical done within the last 30 days. Any changes noted above.     Klarissa Gill PA-C  8/1/2023  7:07 AM

## 2023-08-01 NOTE — OPERATIVE REPORT
PREOPERATIVE DIAGNOSIS:    1. Lumbar radiculopathy. 2.       Lumbar stenosis. 3.       Lumbar spondylolisthesis. POSTOPERATIVE DIAGNOSIS:    1. Lumbar radiculopathy. 2.       Lumbar stenosis. 3.       Lumbar spondylolisthesis. PROCEDURE:    1. Posterior lumbar instrumented fusion L5-S1.  2.       Posterolateral arthrodesis L5-S1.  3.       Use of intraoperative fluoroscopy. 4.       Use of intraoperative neuromonitoring. ASSISTANT:  George Rizzo PA-C, who assisted in positioning, prepping, draping, retracting, and wound closure. ANESTHESIA:  General endotracheal.     COMPLICATIONS:  None. ESTIMATED BLOOD LOSS:  Less than 50 mL. DEEP VEIN THROMBOSIS PROPHYLAXIS:  SCDs and TEDs to lower extremities. PREOPERATIVE ANTIBIOTICS:  Ancef. IMPLANTS:  NuVasive. INDICATIONS:  Please see part 1 of the operative report. OPERATIVE TECHNIQUE:  After performing the anterior lumbar interbody fusion, the patient was then moved to the prone position on a Camacho table with open Smooth frame. Bony prominences were well padded. The back was prepped and draped in usual sterile manner for a procedure of this sort. Time-out was then performed. We then used intraoperative fluoroscopy to suresh the skin over the lateral edge of the pedicles of L5 and S1 bilaterally. Two perispinal incisions measuring 3 cm in length were then made lateral to the skin marking, and a standard Winston approach was carried down to the L5-S1 facet joints bilaterally. We decorticated the facets using a high-speed bur to create our bed for posterior arthrodesis. We then placed Jamshidi needles and guidewires into the L5 and S1 pedicle using fluoroscopy and neuromonitoring. Once they were in good position, I placed 6.5 mm screws, 45 mm in length in S1 and 50 mm in length in L5 bilaterally. A 30 mm olivia was then used to connect the pedicle screws on each side.   End caps and final tighteners were used over each Tulip head, and the towers were then removed. We copiously irrigated the wound. I then decorticated the transverse processes and sacral ala bilaterally and placed the remainder of the allograft bone posterolaterally to create arthrodesis. Final AP and lateral image showed that the hardware was in good position. We closed the fascia using 0 Vicryl, subcutaneous using 2-0 Vicryl, and Steri-Strips and skin glue for the skin. Sterile dressing was applied. The drapes were removed. The patient was gently moved to the supine position on a regular bed and extubated in the OR. She was taken to PACU in good condition. There were no complications during the procedure. I was present throughout the case. All counts were correct.

## 2023-08-01 NOTE — ANESTHESIA PROCEDURE NOTES
Peripheral IV  Date/Time: 8/1/2023 7:45 AM  Inserted by: Florida Friedman CRNA    Placement  Needle size: 21 G  Laterality: left  Location: wrist  Local anesthetic: none  Site prep: alcohol  Technique: anatomical landmarks  Attempts: 1

## 2023-08-01 NOTE — ANESTHESIA PROCEDURE NOTES
Airway  Date/Time: 8/1/2023 7:43 AM  Urgency: Elective    Airway not difficult    General Information and Staff    Patient location during procedure: OR  Anesthesiologist: Duane Hobson MD  Resident/CRNA: Areli Rasmussen CRNA  Performed: CRNA   Performed by: Areli Rasmussen CRNA  Authorized by: Duane Hobson MD      Indications and Patient Condition  Indications for airway management: anesthesia  Sedation level: deep  Preoxygenated: yes  Patient position: sniffing  Mask difficulty assessment: 1 - vent by mask    Final Airway Details  Final airway type: endotracheal airway      Successful airway: ETT  Cuffed: yes   Successful intubation technique: Video laryngoscopy  Endotracheal tube insertion site: oral  Blade: Pao  ETT size (mm): 7.0    Cormack-Lehane Classification: grade I - full view of glottis  Placement verified by: capnometry   Cuff volume (mL): 10  Measured from: lips  ETT to lips (cm): 20  Number of attempts at approach: 1  Number of other approaches attempted: 0

## 2023-08-01 NOTE — ANESTHESIA POSTPROCEDURE EVALUATION
Patient: Horace Morgan    Procedure Summary       Date: 08/01/23 Room / Location: [address] [address] MAIN OR 17 / [address] [place] [address] MAIN OR    Anesthesia Start: 2953 Anesthesia Stop:     Procedures:       L5-S1 anterior lumbar interbody fusion with posterior instrumentation, use of allograft bone (Spine Lumbar)      POSTERIOR LUMBAR LAMINECT SPINAL FUS W/INSTR 1 LEV (Spine Lumbar) Diagnosis: (Spondylolisthesis of lumbar region, lumbar radiculopthy)    Surgeons: Krupa Galloway MD Anesthesiologist: Kike Villareal MD    Anesthesia Type: general ASA Status: 3            Anesthesia Type: general    Vitals Value Taken Time   BP 63/30 08/01/23 1050   Temp 97 08/01/23 1051   Pulse 59 08/01/23 1050   Resp 14 08/01/23 1050   SpO2 99 % 08/01/23 1050   Vitals shown include unvalidated device data. [address] [place] [address] AN Post Evaluation:   Patient Evaluated in PACU  Patient Participation: complete - patient participated  Level of Consciousness: sleepy but conscious  Pain Score: 0  Pain Management: adequate  Airway Patency:patent  Dental exam unchanged from preop  Yes    Cardiovascular Status: stable and hypotensive  Respiratory Status: acceptable and nasal cannula  Postoperative Hydration acceptable  Comments:   Pt transported to PACU intubated waiting to extubate until fully awake due to intra-op emesis. Extubated a few minutes after arrival to PACU. Pt hypostensive ; HR 50\"; denies CP , SOB or dizziness.    On PE:   AAOx3  Lungs: Clear BL  Heart : RRR  Will IV fluid bolus; 12 lead EKG, Trudy and start [address], CRNA  8/1/2023 10:51 AM

## 2023-08-01 NOTE — OPERATIVE REPORT
PREOPERATIVE DIAGNOSIS:    1. Lumbar radiculopathy. 2.       Lumbar stenosis. 3.       Lumbar spondylolisthesis. POSTOPERATIVE DIAGNOSIS:    1. Lumbar radiculopathy. 2.       Lumbar stenosis. 3.       Lumbar spondylolisthesis. PROCEDURE:    1. Anterior lumbar interbody fusion at L5-S1.  2.       Use of PEEK interbody cage at L5-S1.  3.        instrumentation using screws into L5 and S1 to stabilize anterior lumbar interbody fusion implant. 4.       Use of allograft bone. 5.       Use of bone morphogenic protein. 6.       Use and interpretation of intraoperative fluoroscopy. 7.       Use of intraoperative neuromonitoring. CO-SURGEON:  Chris Vargas MD     ASSISTANT:  Manuela Carlton PA-C. He assisted in positioning, prepping, draping, retracting, and wound closure. ANESTHESIA:  General endotracheal.     COMPLICATIONS:  None. ESTIMATED BLOOD LOSS:  Less than 50 mL. DEEP VEIN THROMBOSIS PROPHYLAXIS:  SCDs and TEDs to lower extremities. PREOPERATIVE ANTIBIOTICS:  Ancef. IMPLANTS:  NuVasive. INDICATIONS:  The patient is a 48-year-old female with a history of low back pain, bilateral lower extremity radiculopathy that failed conservative management. AN MRI of the lumbar spine showed evidence of severe foraminal stenosis at L4-5 bilaterally and grade 2 spondylolisthesis. After he failed to improve with conservative management, I recommend the above-listed surgery. We explained to the patient that the risks of surgery include, but are not limited to, infection, nerve injury, vessel injury, vascular injury, deep vein thrombosis, pulmonary embolism, cardiac complications in the perioperative period, sensory deficit, motor deficit, the possibility he may not improve, the possibility that symptoms may become worse, anesthetic complications and death in the perioperative period. OPERATIVE TECHNIQUE:  The patient was seen preoperatively and surgical site was marked.   SCDs and TEDs were placed on lower extremities. She was brought to the OR and placed on a radiolucent bed in the supine position. After successful induction of anesthesia, the abdomen was prepped and draped in the usual sterile manner for a retroperitoneal approach to the L5-S1 segment. Dr. Roscoe Vaughan performed the exposure. Please defer for to his operative report for the exposure part of the procedure. After localizing the segment and exposing it anteriorly, we used a needle to suresh the midline. A knife was then used to create an annulotomy anteriorly. Curettes and pituitaries were then used to perform complete discectomy. The endplates were prepared for fusion. We removed the disc all the way back to the posterior longitudinal ligament. We trialed and elected to use a 14 mm cage that was 12-degree lordotic. We placed allograft bone as well as bone morphogenic protein in the cage and then inserted under fluoroscopic guidance. Once that was in good position, I placed 5.5 mm x 25 mm pedicle screws, 1 in S1 and 1 in L5, to secure the cage to the disc space. Once that was in good position as verified on AP and lateral fluoroscopy, we copiously irrigated the wound. Dr. Roscoe Vaughan then closed the wound and skin. Please defer to his dictation regarding wound closure. I was present throughout the case. All counts were correct. No complications occurred during this procedure.

## 2023-08-01 NOTE — BRIEF OP NOTE
Pre-Operative Diagnosis: Spondylolisthesis of lumbar region, lumbar radiculopthy     Post-Operative Diagnosis: Spondylolisthesis of lumbar region, lumbar radiculopthy      Procedure Performed:   L5-S1 anterior lumbar interbody fusion with posterior instrumentation, use of allograft bone and use of bone morphogenic protein    Surgeon(s) and Role:     Stella Resendiz MD - Primary     Alfred Shea MD    Assistant(s):  Yenni Christie PAc, Valente CSA     Surgical Findings:      Specimen: none     Estimated Blood Loss: 100cc    Dictation Number:      SHERRY Reilly  8/1/2023  10:27 AM

## 2023-08-01 NOTE — PLAN OF CARE
Yany Arce is POD 0. Alert x 4. Hx of gastric sleeve in 2016. Dressing in place to abdomen and x2 incisions to R and L low back. LSO brace on at all times. Receiving IVF and ancef. Voiding via velarde. SCDs, teds for DVT prophylaxis. Oxy as needed for pain. VSS. No acute changes noted throughout shift. Tolerating CL diet. OK to advance per order criteria. Tele placed by pacu RN d/t BP s/p srgy. Frequent ambulation with RN or PCT encouraged. Cough & deep breathe in addition to use of I.S. Fall precautions in place- bed alarm on, bed in lowest position, call light and personal belongings within reach, non-skid socks in place. Frequent rounding by nursing staff. Plan is PT/OT, med clearance.      Problem: Patient Centered Care  Goal: Patient preferences are identified and integrated in the patient's plan of care  Description: Interventions:  - What would you like us to know as we care for you?   - Provide timely, complete, and accurate information to patient/family  - Incorporate patient and family knowledge, values, beliefs, and cultural backgrounds into the planning and delivery of care  - Encourage patient/family to participate in care and decision-making at the level they choose  - Honor patient and family perspectives and choices  Outcome: Progressing     Problem: PAIN - ADULT  Goal: Verbalizes/displays adequate comfort level or patient's stated pain goal  Description: INTERVENTIONS:  - Encourage pt to monitor pain and request assistance  - Assess pain using appropriate pain scale  - Administer analgesics based on type and severity of pain and evaluate response  - Implement non-pharmacological measures as appropriate and evaluate response  - Consider cultural and social influences on pain and pain management  - Manage/alleviate anxiety  - Utilize distraction and/or relaxation techniques  - Monitor for opioid side effects  - Notify MD/LIP if interventions unsuccessful or patient reports new pain  - Anticipate increased pain with activity and pre-medicate as appropriate  Outcome: Progressing

## 2023-08-02 LAB
ANION GAP SERPL CALC-SCNC: 7 MMOL/L (ref 0–18)
BUN BLD-MCNC: 9 MG/DL (ref 7–18)
BUN/CREAT SERPL: 9.8 (ref 10–20)
CALCIUM BLD-MCNC: 8.6 MG/DL (ref 8.5–10.1)
CHLORIDE SERPL-SCNC: 113 MMOL/L (ref 98–112)
CO2 SERPL-SCNC: 26 MMOL/L (ref 21–32)
CREAT BLD-MCNC: 0.92 MG/DL
EGFRCR SERPLBLD CKD-EPI 2021: 74 ML/MIN/1.73M2 (ref 60–?)
GLUCOSE BLD-MCNC: 115 MG/DL (ref 70–99)
HCT VFR BLD AUTO: 31.5 %
HGB BLD-MCNC: 9.5 G/DL
OSMOLALITY SERPL CALC.SUM OF ELEC: 302 MOSM/KG (ref 275–295)
POTASSIUM SERPL-SCNC: 3.8 MMOL/L (ref 3.5–5.1)
SODIUM SERPL-SCNC: 146 MMOL/L (ref 136–145)

## 2023-08-02 RX ORDER — ZOLPIDEM TARTRATE 5 MG/1
5 TABLET ORAL NIGHTLY PRN
Status: DISCONTINUED | OUTPATIENT
Start: 2023-08-02 | End: 2023-08-08

## 2023-08-02 RX ORDER — CALCIUM CARBONATE 500 MG/1
500 TABLET, CHEWABLE ORAL 3 TIMES DAILY PRN
Status: DISCONTINUED | OUTPATIENT
Start: 2023-08-02 | End: 2023-08-08

## 2023-08-02 NOTE — PROGRESS NOTES
[facility]   a division of  Gabriella Wise MD, Fernanda Cardona, PA-C  [place], [address], [address]  Office [phone]  Fax [phone]       No C/O of CP SOB NV. Pain controlled on percocet 5s.  Leg symptoms improved  AVSS  Dressing is CDI  Not passing gas bowel sounds yet  Motor to bilateral LE at baseline 5/5 throughout  Nv intact  Homans neg    S/p L5-S1 ALIF with posterior    Must pass gas and have bowel sounds to advance diet  Mobilize  PT/OT  NO lifting over 15 pounds or ROM of the lumbar spine  Lumbar brace on at all times  May shower 48 hours after surgery  with incision covered  Daily dressing changes to start on POD 2 or upon discharge  Please send patient home with gauze and tape  rx in the chart  Plan for discharge tomorrow  Please change dressing before the patient leaves    F/u in two weeks

## 2023-08-02 NOTE — PHYSICAL THERAPY NOTE
PHYSICAL THERAPY EVALUATION - INPATIENT     Room Number: 415/415-A  Evaluation Date: 8/2/2023  Type of Evaluation: Initial   Physician Order: PT Eval and Treat    Presenting Problem: s/p L5-S1 ALIF with posterior instrumentation 8. 1.23     Reason for Therapy: Mobility Dysfunction and Discharge Planning    PHYSICAL THERAPY ASSESSMENT     Patient is a 46year old female admitted 8/1/2023 for above surgery. Patient's current functional deficits include impaired bed mobility, transfers, ambulation and stair negotiation, which are below the patient's pre-admission status. Patient will benefit from continued inpatient physical therapy to address above issues so that patient may achieve highest functional mobility level. Pt ok to see per rn. Pt recd sitting up edge of bed with OT, friend present, educated in role of PT, goals for session,  pt had been educated in spine precautions, log roll technique by OT prior to arrival.    Pt educated in LSO management, pt required assist to esteban eng. Pt stood to rw with min a, gait training with rw with emphasis on upright posture, safe rw use. Pt tolerated fair, reporting cont high level of pain. Rn aware. Pt returned to bedside chair, instructed to cont to amb 4x/day with staff. Pt educated in chair choice for home, activity recommendations, frequent position changes, pt expressed good understanding of all instructions. All patient questions answered. The patient's Approx Degree of Impairment: 50.57% has been calculated based on documentation in the [facility] '6 clicks' Inpatient Basic Mobility Short Form. Research supports that patients with this level of impairment may benefit from home with intermittent supervision and home PT.    DISCHARGE RECOMMENDATIONS  PT Discharge Recommendations: Home with home health PT; Intermittent Supervision    PLAN  PT Treatment Plan: Bed mobility; Don/doff brace; Endurance; Energy conservation;Patient education;Gait training;Stair training;Transfer training  Rehab Potential : Good  Frequency (Obs): Daily       PHYSICAL THERAPY MEDICAL/SOCIAL HISTORY        Problem List  Active Problems:    Lumbar radiculopathy      HOME SITUATION  Home Situation  Type of Home: House  Home Layout: One level  Stairs to Enter : 2  Lives With: Alone (pt reports she lives alone, but reports she has a friend that will be staying with her)  Patient Owned Equipment: None  Patient Regularly Uses: None     Prior Level of [place]: Pt reports she lives alone, does not use assistive device at baseline. Pt reports her friend will be staying with her to assist as needed. SUBJECTIVE  \"I did not get out of bed yesterday\"    PHYSICAL THERAPY EXAMINATION     OBJECTIVE  Precautions: Spine;Lumbar brace (LSO at all times)  Fall Risk: Standard fall risk    WEIGHT BEARING RESTRICTION                PAIN ASSESSMENT  Rating: Unable to rate  Location: surgical, low back  Management Techniques: Activity promotion; Body mechanics    COGNITION  Overall Cognitive Status:  WFL - within functional limits    RANGE OF MOTION AND STRENGTH ASSESSMENT  Upper extremity ROM and strength are within functional limits   Lower extremity ROM is within functional limits   Lower extremity strength is within functional limits     BALANCE  Static Sitting: Good  Dynamic Sitting: Good  Static Standing: Poor +  Dynamic Standing: Poor +       AM-PAC '6-Clicks' INPATIENT SHORT FORM - BASIC MOBILITY  How much difficulty does the patient currently have. .. Patient Difficulty: Turning over in bed (including adjusting bedclothes, sheets and blankets)?: A Little   Patient Difficulty: Sitting down on and standing up from a chair with arms (e.g., wheelchair, bedside commode, etc.): A Little   Patient Difficulty: Moving from lying on back to sitting on the side of the bed?: A Lot   How much help from another person does the patient currently need. ..    Help from Another: Moving to and from a bed to a chair (including a wheelchair)?: A Little   Help from Another: Need to walk in hospital room?: A Little   Help from Another: Climbing 3-5 steps with a railing?: A Little     AM-PAC Score:  Raw Score: 17   Approx Degree of Impairment: 50.57%   Standardized Score (AM-PAC Scale): 42.13   CMS Modifier (G-Code): CK    FUNCTIONAL ABILITY STATUS  Functional Mobility/Gait Assessment  Gait Assistance: Minimum assistance  Distance (ft): 100  Assistive Device: Rolling walker  Pattern: Shuffle      Exercise/Education Provided:  Bed mobility  Body mechanics  Don/Doff ortho/prosthesis  Energy conservation  Functional activity tolerated  Gait training  Transfer training    Patient End of Session: Up in chair;Needs met;Call light within reach;RN aware of session/findings; All patient questions and concerns addressed; Family present    CURRENT GOALS    Goals to be met by: 8/3/23  Patient Goal Patient's self-stated goal is: to walk   Goal #1 Patient is able to demonstrate supine - sit EOB @ level: supervision     Goal #1   Current Status    Goal #2 Patient is able to demonstrate transfers Sit to/from Stand at assistance level: supervision with walker - rolling     Goal #2  Current Status    Goal #3 Patient is able to ambulate 100 feet with assist device: walker - rolling at assistance level: supervision   Goal #3   Current Status    Goal #4 Patient will negotiate 2 stairs/one curb w/ assistive device and supervision   Goal #4   Current Status    Goal #5 Patient to demonstrate independence with home activity/exercise instructions provided to patient in preparation for discharge.    Goal #5   Current Status    Goal #6    Goal #6  Current Status    Patient Evaluation Complexity Level:  History Low - no personal factors and/or co-morbidities   Examination of body systems Low - addressing 1-2 elements   Clinical Presentation Low - Stable   Clinical Decision Making Low Complexity       Gait Trainin minutes  Therapeutic Activity: 12 minutes

## 2023-08-02 NOTE — PLAN OF CARE
Patient alert and oriented X4. Post-op day 1. Dressing in place to lower back X2 and lower abdomen. LSO brace at all times. [place]vel[place] removed, check void. SCD's and angela hose for DVT prophylaxis. Remote tele in place. Pain management with dilaudid and percocet prn. Clear liquid diet. Encouraged to cough and deep breathe with incentive spirometer and patient is compliant. Fall precautions in place including bed in lowest position, call light and personal belongings within reach, non-skid socks. Frequent rounding by nursing staff. Plan is pending PT/OT eval.    Problem: Patient Centered Care  Goal: Patient preferences are identified and integrated in the patient's plan of care  Description: Interventions:  - What would you like us to know as we care for you?  My friend is here with me  - Provide timely, complete, and accurate information to patient/family  - Incorporate patient and family knowledge, values, beliefs, and cultural backgrounds into the planning and delivery of care  - Encourage patient/family to participate in care and decision-making at the level they choose  - Honor patient and family perspectives and choices  Outcome: Progressing     Problem: PAIN - ADULT  Goal: Verbalizes/displays adequate comfort level or patient's stated pain goal  Description: INTERVENTIONS:  - Encourage pt to monitor pain and request assistance  - Assess pain using appropriate pain scale  - Administer analgesics based on type and severity of pain and evaluate response  - Implement non-pharmacological measures as appropriate and evaluate response  - Consider cultural and social influences on pain and pain management  - Manage/alleviate anxiety  - Utilize distraction and/or relaxation techniques  - Monitor for opioid side effects  - Notify MD/LIP if interventions unsuccessful or patient reports new pain  - Anticipate increased pain with activity and pre-medicate as appropriate  Outcome: Progressing     Problem: RISK FOR INFECTION - ADULT  Goal: Absence of fever/infection during anticipated neutropenic period  Description: INTERVENTIONS  - Monitor WBC  - Administer growth factors as ordered  - Implement neutropenic guidelines  Outcome: Progressing     Problem: SAFETY ADULT - FALL  Goal: Free from fall injury  Description: INTERVENTIONS:  - Assess pt frequently for physical needs  - Identify cognitive and physical deficits and behaviors that affect risk of falls.   - [place] fall precautions as indicated by assessment.  - Educate pt/family on patient safety including physical limitations  - Instruct pt to call for assistance with activity based on assessment  - Modify environment to reduce risk of injury  - Provide assistive devices as appropriate  - Consider OT/PT consult to assist with strengthening/mobility  - Encourage toileting schedule  Outcome: Progressing     Problem: DISCHARGE PLANNING  Goal: Discharge to home or other facility with appropriate resources  Description: INTERVENTIONS:  - Identify barriers to discharge w/pt and caregiver  - Include patient/family/discharge partner in discharge planning  - Arrange for needed discharge resources and transportation as appropriate  - Identify discharge learning needs (meds, wound care, etc)  - Arrange for interpreters to assist at discharge as needed  - Consider post-discharge preferences of patient/family/discharge partner  - Complete POLST form as appropriate  - Assess patient's ability to be responsible for managing their own health  - Refer to Case Management Department for coordinating discharge planning if the patient needs post-hospital services based on physician/LIP order or complex needs related to functional status, cognitive ability or social support system  Outcome: Progressing

## 2023-08-02 NOTE — PLAN OF CARE
Post-op day #1. Dressing in place to abdomen and lower back. LSO brace on. Monitoring vital signs- stable at this time. Remote tele. No acute changes noted throughout shift. Tolerating diet. Voiding up to bathroom. SCDs and TEDs for DVT prophylaxis. Pain medication provided as needed. Up with standby assist and a walker. Encouraged frequent ambulation and use of incentive spirometer. Fall precautions maintained- bed alarm on, bed locked in lowest position, call light and personal belongings within reach, non-skid socks in place to bilateral feet. Frequent rounding by nursing staff. Plan to discharge home once medically cleared. Patients BP has been on the lower side, feels a little foggy. Gave PRN bolus this afternoon. MD aware. Encouraging oral intake.      Problem: Patient Centered Care  Goal: Patient preferences are identified and integrated in the patient's plan of care  Description: Interventions:  - What would you like us to know as we care for you?  - Provide timely, complete, and accurate information to patient/family  - Incorporate patient and family knowledge, values, beliefs, and cultural backgrounds into the planning and delivery of care  - Encourage patient/family to participate in care and decision-making at the level they choose  - Honor patient and family perspectives and choices  Outcome: Progressing     Problem: PAIN - ADULT  Goal: Verbalizes/displays adequate comfort level or patient's stated pain goal  Description: INTERVENTIONS:  - Encourage pt to monitor pain and request assistance  - Assess pain using appropriate pain scale  - Administer analgesics based on type and severity of pain and evaluate response  - Implement non-pharmacological measures as appropriate and evaluate response  - Consider cultural and social influences on pain and pain management  - Manage/alleviate anxiety  - Utilize distraction and/or relaxation techniques  - Monitor for opioid side effects  - Notify MD/LIP if interventions unsuccessful or patient reports new pain  - Anticipate increased pain with activity and pre-medicate as appropriate  Outcome: Progressing     Problem: RISK FOR INFECTION - ADULT  Goal: Absence of fever/infection during anticipated neutropenic period  Description: INTERVENTIONS  - Monitor WBC  - Administer growth factors as ordered  - Implement neutropenic guidelines  Outcome: Progressing     Problem: SAFETY ADULT - FALL  Goal: Free from fall injury  Description: INTERVENTIONS:  - Assess pt frequently for physical needs  - Identify cognitive and physical deficits and behaviors that affect risk of falls.   - [place] fall precautions as indicated by assessment.  - Educate pt/family on patient safety including physical limitations  - Instruct pt to call for assistance with activity based on assessment  - Modify environment to reduce risk of injury  - Provide assistive devices as appropriate  - Consider OT/PT consult to assist with strengthening/mobility  - Encourage toileting schedule  Outcome: Progressing     Problem: DISCHARGE PLANNING  Goal: Discharge to home or other facility with appropriate resources  Description: INTERVENTIONS:  - Identify barriers to discharge w/pt and caregiver  - Include patient/family/discharge partner in discharge planning  - Arrange for needed discharge resources and transportation as appropriate  - Identify discharge learning needs (meds, wound care, etc)  - Arrange for interpreters to assist at discharge as needed  - Consider post-discharge preferences of patient/family/discharge partner  - Complete POLST form as appropriate  - Assess patient's ability to be responsible for managing their own health  - Refer to Case Management Department for coordinating discharge planning if the patient needs post-hospital services based on physician/LIP order or complex needs related to functional status, cognitive ability or social support system  Outcome: Progressing

## 2023-08-03 LAB
ANION GAP SERPL CALC-SCNC: 4 MMOL/L (ref 0–18)
BUN BLD-MCNC: 6 MG/DL (ref 7–18)
BUN/CREAT SERPL: 7.4 (ref 10–20)
CALCIUM BLD-MCNC: 8.7 MG/DL (ref 8.5–10.1)
CHLORIDE SERPL-SCNC: 111 MMOL/L (ref 98–112)
CO2 SERPL-SCNC: 27 MMOL/L (ref 21–32)
CREAT BLD-MCNC: 0.81 MG/DL
EGFRCR SERPLBLD CKD-EPI 2021: 86 ML/MIN/1.73M2 (ref 60–?)
GLUCOSE BLD-MCNC: 114 MG/DL (ref 70–99)
OSMOLALITY SERPL CALC.SUM OF ELEC: 292 MOSM/KG (ref 275–295)
POTASSIUM SERPL-SCNC: 3.9 MMOL/L (ref 3.5–5.1)
SODIUM SERPL-SCNC: 142 MMOL/L (ref 136–145)

## 2023-08-03 RX ORDER — SIMETHICONE 80 MG
80 TABLET,CHEWABLE ORAL 4 TIMES DAILY PRN
Status: DISCONTINUED | OUTPATIENT
Start: 2023-08-03 | End: 2023-08-08

## 2023-08-03 RX ORDER — SODIUM CHLORIDE 9 MG/ML
INJECTION, SOLUTION INTRAVENOUS CONTINUOUS
Status: DISCONTINUED | OUTPATIENT
Start: 2023-08-03 | End: 2023-08-08

## 2023-08-03 NOTE — PHYSICAL THERAPY NOTE
PHYSICAL THERAPY TREATMENT NOTE - INPATIENT     Room Number: 415/415-A       Presenting Problem: s/p L5-S1 ALIF with posterior instrumentation 8. 1.    Problem List  Active Problems:    Lumbar radiculopathy      PHYSICAL THERAPY ASSESSMENT   Chart reviewed. RN approved participation in physical therapy. Lesia Mcclendon PPE worn by therapist: mask, gloves, and goggles. Patient was wearing a mask during session. Patient presented in bed with 6/10 pain. Patient with good  progress towards goals during this session. Education provided on Spine precautions, Physical therapy plan of care, and physiological benefits of out of bed mobility. Patient with good carryover. Bed mobility: Min assist  Transfers: Min assist  Gait Assistance: Minimum assistance  Distance (ft): 2 x 50  Assistive Device: Rolling walker  Pattern: Shuffle          Pt seen daily. Min a for bed mobility and transfer. Extra time provide to complete task. EOB sitting balance activity with emphasis on core stabilization. Spinal precautions reviewed;education;all questions and concerns addressed. Pt amb 2 x 50 ft with RW and min a. There ex. Patient was left in bedside chair at end of session with all needs in reach. The patient's Approx Degree of Impairment: 50.57% has been calculated based on documentation in the [facility] '6 clicks' Inpatient Basic Mobility Short Form. Research supports that patients with this level of impairment may benefit from Home with home health PT.  RN aware of patient status post session. DISCHARGE RECOMMENDATIONS  PT Discharge Recommendations: Home with home health PT     PLAN  PT Treatment Plan: Don/doff brace; Patient education;Gait training    SUBJECTIVE  Pt reports being ready for PT RX    OBJECTIVE  Precautions: Spine;Lumbar brace    WEIGHT BEARING RESTRICTION  Weight Bearing Restriction: None                PAIN ASSESSMENT   Ratin  Location: surgical, low back  Management Techniques: Activity promotion; Body mechanics    BALANCE Static Sitting: Good  Dynamic Sitting: Good           Static Standing: Poor +  Dynamic Standing: Poor +    ACTIVITY TOLERANCE                         O2 WALK       AM-PAC '6-Clicks' INPATIENT SHORT FORM - BASIC MOBILITY  How much difficulty does the patient currently have. .. Patient Difficulty: Turning over in bed (including adjusting bedclothes, sheets and blankets)?: A Little   Patient Difficulty: Sitting down on and standing up from a chair with arms (e.g., wheelchair, bedside commode, etc.): A Little   Patient Difficulty: Moving from lying on back to sitting on the side of the bed?: A Lot   How much help from another person does the patient currently need. .. Help from Another: Moving to and from a bed to a chair (including a wheelchair)?: A Little   Help from Another: Need to walk in hospital room?: A Little   Help from Another: Climbing 3-5 steps with a railing?: A Little     AM-PAC Score:  Raw Score: 17   Approx Degree of Impairment: 50.57%   Standardized Score (AM-PAC Scale): 42.13   CMS Modifier (G-Code): CK      Additional information:     THERAPEUTIC EXERCISES  Lower Extremity Ankle pumps  Glut sets  Quad sets     Position Supine       Patient End of Session: Up in chair;Call light within reach;RN aware of session/findings; All patient questions and concerns addressed    CURRENT GOALS     Patient Goal Patient's self-stated goal is: to walk   Goal #1 Patient is able to demonstrate supine - sit EOB @ level: supervision     Goal #1   Current Status Min a   Goal #2 Patient is able to demonstrate transfers Sit to/from Stand at assistance level: supervision with walker - rolling     Goal #2  Current Status Min a   Goal #3 Patient is able to ambulate 100 feet with assist device: walker - rolling at assistance level: supervision   Goal #3   Current Status Pt amb 2 x 50 ft with RW and min a   Goal #4 Patient will negotiate 2 stairs/one curb w/ assistive device and supervision   Goal #4   Current Status NT   Goal #5 Patient to demonstrate independence with home activity/exercise instructions provided to patient in preparation for discharge. Goal #5   Current Status In progress   Goal #6    Goal #6  Current Status    Gait -15 minutes; There ex-15 minutes.

## 2023-08-03 NOTE — PROGRESS NOTES
Ortho Spine Progress Note      No C/O of CP SOB NV. Pain controlled on current regimen but hypotension was a concern overnight. Still on liquid diet, not yet passing gas. Leg symptoms improved and has been OOB walking. AVSS  Dressing is CDI  Motor to bilateral LE at baseline 5/5 throughout  NV intact. Distal pulses are palpable with good cap refill  Homans neg    Lab Results       Component                Value               Date                       CREATSERUM               0.81                08/03/2023                 BUN                      6                   08/03/2023                 NA                       142                 08/03/2023                 K                        3.9                 08/03/2023                 CL                       111                 08/03/2023                 CO2                      27.0                08/03/2023                 GLU                      114                 08/03/2023                 CA                       8.7                 08/03/2023                S/p L5-S1 ALIF with posterior instrumentation    Must pass gas with +bowel sounds to advance to regular diet. Tolerating liquids. Mobilize  PT/OT  NO lifting over 15 pounds or ROM of the lumbar spine  Lumbar brace on at all times  May shower 48 hours after surgery with incision covered  Daily dressing changes to start today  Please send patient home with gauze and tape  Rx meds in the chart  May d/c to home from ortho standpoint when bowel sounds present and tolerating regular diet.   Please change dressing before the patient leaves    Follow up with Dr. Simran Lucero in office in 2 weeks      Carlitos Che PA-C  PA with Dr. Cem Alfred

## 2023-08-03 NOTE — PLAN OF CARE
Patient alert and oriented X4. Post-op day 2. Dressing in place to lower back and lower abdomen. LSO brace at all times. Voiding freely. SCD's and angela hose for DVT prophylaxis. Remote tele in place. Pain management with percocet and flexeril prn. Full liquid diet. Encouraged to cough and deep breathe with incentive spirometer and patient is compliant. Fall precautions in place including bed in lowest position, call light and personal belongings within reach, non-skid socks. Frequent rounding by nursing staff. Plan is home with [facility] PT pending medical clearance. Problem: Patient Centered Care  Goal: Patient preferences are identified and integrated in the patient's plan of care  Description: Interventions:  - What would you like us to know as we care for you?  My friend is here with me  - Provide timely, complete, and accurate information to patient/family  - Incorporate patient and family knowledge, values, beliefs, and cultural backgrounds into the planning and delivery of care  - Encourage patient/family to participate in care and decision-making at the level they choose  - Honor patient and family perspectives and choices  Outcome: Progressing     Problem: PAIN - ADULT  Goal: Verbalizes/displays adequate comfort level or patient's stated pain goal  Description: INTERVENTIONS:  - Encourage pt to monitor pain and request assistance  - Assess pain using appropriate pain scale  - Administer analgesics based on type and severity of pain and evaluate response  - Implement non-pharmacological measures as appropriate and evaluate response  - Consider cultural and social influences on pain and pain management  - Manage/alleviate anxiety  - Utilize distraction and/or relaxation techniques  - Monitor for opioid side effects  - Notify MD/LIP if interventions unsuccessful or patient reports new pain  - Anticipate increased pain with activity and pre-medicate as appropriate  Outcome: Progressing     Problem: RISK FOR INFECTION - ADULT  Goal: Absence of fever/infection during anticipated neutropenic period  Description: INTERVENTIONS  - Monitor WBC  - Administer growth factors as ordered  - Implement neutropenic guidelines  Outcome: Progressing     Problem: SAFETY ADULT - FALL  Goal: Free from fall injury  Description: INTERVENTIONS:  - Assess pt frequently for physical needs  - Identify cognitive and physical deficits and behaviors that affect risk of falls.   - [place] fall precautions as indicated by assessment.  - Educate pt/family on patient safety including physical limitations  - Instruct pt to call for assistance with activity based on assessment  - Modify environment to reduce risk of injury  - Provide assistive devices as appropriate  - Consider OT/PT consult to assist with strengthening/mobility  - Encourage toileting schedule  Outcome: Progressing     Problem: DISCHARGE PLANNING  Goal: Discharge to home or other facility with appropriate resources  Description: INTERVENTIONS:  - Identify barriers to discharge w/pt and caregiver  - Include patient/family/discharge partner in discharge planning  - Arrange for needed discharge resources and transportation as appropriate  - Identify discharge learning needs (meds, wound care, etc)  - Arrange for interpreters to assist at discharge as needed  - Consider post-discharge preferences of patient/family/discharge partner  - Complete POLST form as appropriate  - Assess patient's ability to be responsible for managing their own health  - Refer to Case Management Department for coordinating discharge planning if the patient needs post-hospital services based on physician/LIP order or complex needs related to functional status, cognitive ability or social support system  Outcome: Progressing

## 2023-08-03 NOTE — PLAN OF CARE
Patient alert and orientated x4. Post-op day #2. Dressing in place to abdomen and lower back. Patient blood pressure has been on the lower end, educated on pain medication and possible side effects. Patient and family voiced concerns it didn't have to do with blood pressure running on the lower end, and wanted so speak with hospitalist. Spoke with Dr. Rosendo Degroot continue IV fluids . 9% at 125 ml/hr. And stated will talk to patient and family members at bedside. Tolerating diet. Voiding freely. Patient is on room air . SCDs on for DVT prophylaxis. PRN Percoset given for Pain medication provided as needed. Up with x1 assist and a walker. Encouraged frequent ambulation and use of incentive spirometer. Fall precautions maintained- bed alarm on, bed locked in lowest position, call light and personal belongings within reach, non-skid socks in place to bilateral feet. Frequent rounding by nursing staff. Plan to discharge home with home health when medically cleared.                     Problem: Patient Centered Care  Goal: Patient preferences are identified and integrated in the patient's plan of care  Description: Interventions:  - What would you like us to know as we care for you?  - Provide timely, complete, and accurate information to patient/family  - Incorporate patient and family knowledge, values, beliefs, and cultural backgrounds into the planning and delivery of care  - Encourage patient/family to participate in care and decision-making at the level they choose  - Honor patient and family perspectives and choices  Outcome: Progressing     Problem: PAIN - ADULT  Goal: Verbalizes/displays adequate comfort level or patient's stated pain goal  Description: INTERVENTIONS:  - Encourage pt to monitor pain and request assistance  - Assess pain using appropriate pain scale  - Administer analgesics based on type and severity of pain and evaluate response  - Implement non-pharmacological measures as appropriate and evaluate response  - Consider cultural and social influences on pain and pain management  - Manage/alleviate anxiety  - Utilize distraction and/or relaxation techniques  - Monitor for opioid side effects  - Notify MD/LIP if interventions unsuccessful or patient reports new pain  - Anticipate increased pain with activity and pre-medicate as appropriate  Outcome: Progressing     Problem: RISK FOR INFECTION - ADULT  Goal: Absence of fever/infection during anticipated neutropenic period  Description: INTERVENTIONS  - Monitor WBC  - Administer growth factors as ordered  - Implement neutropenic guidelines  Outcome: Progressing     Problem: SAFETY ADULT - FALL  Goal: Free from fall injury  Description: INTERVENTIONS:  - Assess pt frequently for physical needs  - Identify cognitive and physical deficits and behaviors that affect risk of falls.   - [place] fall precautions as indicated by assessment.  - Educate pt/family on patient safety including physical limitations  - Instruct pt to call for assistance with activity based on assessment  - Modify environment to reduce risk of injury  - Provide assistive devices as appropriate  - Consider OT/PT consult to assist with strengthening/mobility  - Encourage toileting schedule  Outcome: Progressing     Problem: DISCHARGE PLANNING  Goal: Discharge to home or other facility with appropriate resources  Description: INTERVENTIONS:  - Identify barriers to discharge w/pt and caregiver  - Include patient/family/discharge partner in discharge planning  - Arrange for needed discharge resources and transportation as appropriate  - Identify discharge learning needs (meds, wound care, etc)  - Arrange for interpreters to assist at discharge as needed  - Consider post-discharge preferences of patient/family/discharge partner  - Complete POLST form as appropriate  - Assess patient's ability to be responsible for managing their own health  - Refer to Case Management Department for coordinating discharge planning if the patient needs post-hospital services based on physician/LIP order or complex needs related to functional status, cognitive ability or social support system  Outcome: Progressing

## 2023-08-04 LAB
ANION GAP SERPL CALC-SCNC: 4 MMOL/L (ref 0–18)
BUN BLD-MCNC: 8 MG/DL (ref 7–18)
BUN/CREAT SERPL: 11.1 (ref 10–20)
CALCIUM BLD-MCNC: 8.5 MG/DL (ref 8.5–10.1)
CHLORIDE SERPL-SCNC: 113 MMOL/L (ref 98–112)
CO2 SERPL-SCNC: 25 MMOL/L (ref 21–32)
CREAT BLD-MCNC: 0.72 MG/DL
DEPRECATED RDW RBC AUTO: 48.8 FL (ref 35.1–46.3)
EGFRCR SERPLBLD CKD-EPI 2021: 99 ML/MIN/1.73M2 (ref 60–?)
ERYTHROCYTE [DISTWIDTH] IN BLOOD BY AUTOMATED COUNT: 16.2 % (ref 11–15)
GLUCOSE BLD-MCNC: 92 MG/DL (ref 70–99)
HCT VFR BLD AUTO: 30.2 %
HGB BLD-MCNC: 9.1 G/DL
MCH RBC QN AUTO: 25 PG (ref 26–34)
MCHC RBC AUTO-ENTMCNC: 30.1 G/DL (ref 31–37)
MCV RBC AUTO: 83 FL
OSMOLALITY SERPL CALC.SUM OF ELEC: 292 MOSM/KG (ref 275–295)
PLATELET # BLD AUTO: 153 10(3)UL (ref 150–450)
POTASSIUM SERPL-SCNC: 3.6 MMOL/L (ref 3.5–5.1)
RBC # BLD AUTO: 3.64 X10(6)UL
SODIUM SERPL-SCNC: 142 MMOL/L (ref 136–145)
WBC # BLD AUTO: 5.5 X10(3) UL (ref 4–11)

## 2023-08-04 RX ORDER — POLYETHYLENE GLYCOL 3350 17 G/17G
17 POWDER, FOR SOLUTION ORAL ONCE
Status: DISCONTINUED | OUTPATIENT
Start: 2023-08-04 | End: 2023-08-08

## 2023-08-04 NOTE — PROGRESS NOTES
[facility]   a division of  Damon Ramirez MD, Afshin Scales, PAKiC  Debi Ordonez, [address], [address] [facility]  Office [phone]  Fax [phone]       No C/O of CP SOB NV. Pain controlled on percocet 5s. Leg symptoms improved  AVSS  Dressing is CDI  Bowel sounds yesterday but not passing gas  Motor to bilateral LE at baseline 5/5 throughout  Nv intact  Homans neg    S/p L5-S1 ALIF with posterior instrumentation    Not passing gas yet. Encourage walking and decreased narcotics.  CLD until can pass gas  Mobilize  PT/OT  NO lifting over 15 pounds or ROM of the lumbar spine  Lumbar brace on at all times  May shower 48 hours after surgery  with incision covered  Daily dressing changes to start on POD 2 or upon discharge  Please send patient home with gauze and tape  rx in the chart  May d/c to home from ortho standpoint if pain controlled and cleared by PT  Please change dressing before the patient leaves    F/u in two weeks

## 2023-08-04 NOTE — PLAN OF CARE
Patient is alert and oriented. On tele with no calls. SCD and JONELLE on. Voiding. Dressing in place. LSO brace on at all times. Percocet given for pain management. Blood pressure monitored. Iv fluids running. Family at bedside. Call light within reach. Frequent rounding done.      Problem: Patient Centered Care  Goal: Patient preferences are identified and integrated in the patient's plan of care  Description: Interventions:  - What would you like us to know as we care for you?   - Provide timely, complete, and accurate information to patient/family  - Incorporate patient and family knowledge, values, beliefs, and cultural backgrounds into the planning and delivery of care  - Encourage patient/family to participate in care and decision-making at the level they choose  - Honor patient and family perspectives and choices  Outcome: Progressing     Problem: PAIN - ADULT  Goal: Verbalizes/displays adequate comfort level or patient's stated pain goal  Description: INTERVENTIONS:  - Encourage pt to monitor pain and request assistance  - Assess pain using appropriate pain scale  - Administer analgesics based on type and severity of pain and evaluate response  - Implement non-pharmacological measures as appropriate and evaluate response  - Consider cultural and social influences on pain and pain management  - Manage/alleviate anxiety  - Utilize distraction and/or relaxation techniques  - Monitor for opioid side effects  - Notify MD/LIP if interventions unsuccessful or patient reports new pain  - Anticipate increased pain with activity and pre-medicate as appropriate  Outcome: Progressing     Problem: RISK FOR INFECTION - ADULT  Goal: Absence of fever/infection during anticipated neutropenic period  Description: INTERVENTIONS  - Monitor WBC  - Administer growth factors as ordered  - Implement neutropenic guidelines  Outcome: Progressing     Problem: SAFETY ADULT - FALL  Goal: Free from fall injury  Description: INTERVENTIONS:  - Assess pt frequently for physical needs  - Identify cognitive and physical deficits and behaviors that affect risk of falls.   - [place] fall precautions as indicated by assessment.  - Educate pt/family on patient safety including physical limitations  - Instruct pt to call for assistance with activity based on assessment  - Modify environment to reduce risk of injury  - Provide assistive devices as appropriate  - Consider OT/PT consult to assist with strengthening/mobility  - Encourage toileting schedule  Outcome: Progressing

## 2023-08-04 NOTE — PLAN OF CARE
Patient A&Ox4. POD#3. VSS. Patient A&Ox4. Anterior and posterior dressings C/D/I. LSO brace on at all times. Patient passed gas today and diet was upgraded to soft. BP remains soft, Dr. Sebastian Salmeron is aware. PRN Percocet last administered at 1428. SCDs on and running. Bed alarm is on, call light and personal belongings remain in place. Plan is home with home health.    Problem: Patient Centered Care  Goal: Patient preferences are identified and integrated in the patient's plan of care  Description: Interventions:  - Provide timely, complete, and accurate information to patient/family  - Incorporate patient and family knowledge, values, beliefs, and cultural backgrounds into the planning and delivery of care  - Encourage patient/family to participate in care and decision-making at the level they choose  - Honor patient and family perspectives and choices  Outcome: Progressing     Problem: PAIN - ADULT  Goal: Verbalizes/displays adequate comfort level or patient's stated pain goal  Description: INTERVENTIONS:  - Encourage pt to monitor pain and request assistance  - Assess pain using appropriate pain scale  - Administer analgesics based on type and severity of pain and evaluate response  - Implement non-pharmacological measures as appropriate and evaluate response  - Consider cultural and social influences on pain and pain management  - Manage/alleviate anxiety  - Utilize distraction and/or relaxation techniques  - Monitor for opioid side effects  - Notify MD/LIP if interventions unsuccessful or patient reports new pain  - Anticipate increased pain with activity and pre-medicate as appropriate  Outcome: Progressing     Problem: RISK FOR INFECTION - ADULT  Goal: Absence of fever/infection during anticipated neutropenic period  Description: INTERVENTIONS  - Monitor WBC  - Administer growth factors as ordered  - Implement neutropenic guidelines  Outcome: Progressing     Problem: SAFETY ADULT - FALL  Goal: Free from fall injury  Description: INTERVENTIONS:  - Assess pt frequently for physical needs  - Identify cognitive and physical deficits and behaviors that affect risk of falls.   - [place] fall precautions as indicated by assessment.  - Educate pt/family on patient safety including physical limitations  - Instruct pt to call for assistance with activity based on assessment  - Modify environment to reduce risk of injury  - Provide assistive devices as appropriate  - Consider OT/PT consult to assist with strengthening/mobility  - Encourage toileting schedule  Outcome: Progressing     Problem: DISCHARGE PLANNING  Goal: Discharge to home or other facility with appropriate resources  Description: INTERVENTIONS:  - Identify barriers to discharge w/pt and caregiver  - Include patient/family/discharge partner in discharge planning  - Arrange for needed discharge resources and transportation as appropriate  - Identify discharge learning needs (meds, wound care, etc)  - Arrange for interpreters to assist at discharge as needed  - Consider post-discharge preferences of patient/family/discharge partner  - Complete POLST form as appropriate  - Assess patient's ability to be responsible for managing their own health  - Refer to Case Management Department for coordinating discharge planning if the patient needs post-hospital services based on physician/LIP order or complex needs related to functional status, cognitive ability or social support system  Outcome: Progressing

## 2023-08-04 NOTE — PHYSICAL THERAPY NOTE
PHYSICAL THERAPY TREATMENT NOTE - INPATIENT     Room Number: 415/415-A       Presenting Problem: s/p L5-S1 ALIF with posterior instrumentation 8. 1.    Problem List  Active Problems:    Lumbar radiculopathy      PHYSICAL THERAPY ASSESSMENT   Chart reviewed. RN approved participation in physical therapy. Paramjit Michael PPE worn by therapist: mask, gloves, and goggles. Patient was wearing a mask during session. Patient presented in bed with 6/10 pain. Patient with good  progress towards goals during this session. Education provided on Spine precautions, Physical therapy plan of care, and physiological benefits of out of bed mobility. Patient with good carryover. Bed mobility: Min assist  Transfers: Min assist  Gait Assistance: Contact guard assist  Distance (ft): 2 x 100  Assistive Device: Rolling walker  Pattern: Shuffle          Pt seen daily. Min a for bed mobility and transfer. Extra time provide to complete task. EOB sitting balance activity with emphasis on core stabilization. Spinal precautions reviewed;education;all questions and concerns addressed. Pt amb 2 x 100 ft with RW and CGA. There ex. Pt with inc amb distance and improved gait pattern. Patient was left in bedside chair at end of session with all needs in reach. The patient's Approx Degree of Impairment: 46.58% has been calculated based on documentation in the [facility] '6 clicks' Inpatient Basic Mobility Short Form. Research supports that patients with this level of impairment may benefit from Home with home health PT.  RN aware of patient status post session. DISCHARGE RECOMMENDATIONS  PT Discharge Recommendations: Home with home health PT     PLAN  PT Treatment Plan: Don/doff brace; Patient education;Gait training    SUBJECTIVE  Pt reports being ready for PT RX    OBJECTIVE  Precautions: Spine;Lumbar brace    WEIGHT BEARING RESTRICTION  Weight Bearing Restriction: None                PAIN ASSESSMENT   Ratin  Location: surgical, low back  Management Techniques: Activity promotion; Body mechanics    BALANCE                                                                                                                       Static Sitting: Good  Dynamic Sitting: Good           Static Standing: Poor +  Dynamic Standing: Poor +    ACTIVITY TOLERANCE                         O2 WALK       AM-PAC '6-Clicks' INPATIENT SHORT FORM - BASIC MOBILITY  How much difficulty does the patient currently have. .. Patient Difficulty: Turning over in bed (including adjusting bedclothes, sheets and blankets)?: A Little   Patient Difficulty: Sitting down on and standing up from a chair with arms (e.g., wheelchair, bedside commode, etc.): A Little   Patient Difficulty: Moving from lying on back to sitting on the side of the bed?: A Little   How much help from another person does the patient currently need. .. Help from Another: Moving to and from a bed to a chair (including a wheelchair)?: A Little   Help from Another: Need to walk in hospital room?: A Little   Help from Another: Climbing 3-5 steps with a railing?: A Little     AM-PAC Score:  Raw Score: 18   Approx Degree of Impairment: 46.58%   Standardized Score (AM-PAC Scale): 43.63   CMS Modifier (G-Code): CK      Additional information:     THERAPEUTIC EXERCISES  Lower Extremity Ankle pumps  Glut sets  Quad sets     Position Supine       Patient End of Session: Up in chair;Call light within reach;RN aware of session/findings; All patient questions and concerns addressed    CURRENT GOALS     Patient Goal Patient's self-stated goal is: to walk   Goal #1 Patient is able to demonstrate supine - sit EOB @ level: supervision     Goal #1   Current Status Min a   Goal #2 Patient is able to demonstrate transfers Sit to/from Stand at assistance level: supervision with walker - rolling     Goal #2  Current Status Min a   Goal #3 Patient is able to ambulate 100 feet with assist device: walker - rolling at assistance level: supervision   Goal #3   Current Status Pt amb 2 x 100 ft with RW and CGA   Goal #4 Patient will negotiate 2 stairs/one curb w/ assistive device and supervision   Goal #4   Current Status NT   Goal #5 Patient to demonstrate independence with home activity/exercise instructions provided to patient in preparation for discharge. Goal #5   Current Status In progress   Goal #6    Goal #6  Current Status    Gait -15 minutes; There ex-15 minutes.

## 2023-08-04 NOTE — OPERATIVE REPORT
[facility]     Operative Report    Patient Name:  Liz Mcqueen  MR:  [id number]  :  1970  DOS:  23    Preop Dx:    1. Lumbar radiculopathy. 2.       Lumbar stenosis. 3.       Lumbar spondylolisthesis. Postop Dx:    1. Lumbar radiculopathy. 2.       Lumbar stenosis. 3.       Lumbar spondylolisthesis. Procedure:    Anterior lumbar interbody fusion L5 to S1 (51844-50)  Anterior lumbar instrumentation (68912-17)  Anterior insertion of interbody biomechanical device (70409-95)  Ureterolysis (07030)  Real-time intraoperative C-arm fluoroscopy with image guidance and surgeon interpretation (18625)    Surgeons:  Hollie Gomez MD, Jeremiah Richardson MD  Surgical Assistant.: Vianey Robertson CSA  PA: Shannan Gill PA-C; SHERRY Mccabe  EBL: 50 ml  Complications:  None. Description:    The patient is a 46year old female with lumbar radiculopathy, lumbar stenosis, and lumbar spondylolisthesis who is undergoing an ALIF with Dr. Yecenia Zimmerman. General surgery was asked to assist in exposing the anterior disc space. The patient was taken to the OR and placed in supine position. General anesthesia was established and the abdomen was prepped in standard fashion. Fluoroscopy was used to suresh the L5-S1 level. A transverse incision was made over the left lower quadrant. The anterior left rectus fascia was incised and the left rectus muscle was mobilized medially. A vertical incision was made in the tranversalis fascia and the retroperitoneum was entered. The peritoneum and left ureter were identified and mobilized medially. Left ureterolysis was performed safely and no injury occurred. Just medial to the left iliac vein, we dissected the prevertebral space. The median sacral vessels were divided to further expose the L5-S1 disc space. The retractors were placed to expose the anterior disc space. Fluoroscopy was used to confirm the L5-S1 disc level.   The case was turned over to Dr. Ernestine Corrales and I assisted with the discectomy and implant placement. After fluoroscopic confirmation of the implant placement, the operative field was irrigated with saline. Adequate hemostasis was obtained. The retractors were slowly removed and the operative field remained hemostatic. There were no injury to the left ureter, peritoneal contents or the sympathetic chain during our dissection. A left transversus abdominus plane block was performed using 30 ml of 0.5% marcaine. The anterior fascia was closed using 0 looped PDS. We closed the subcutaneous tissue and skin. All instrument and sponge counts were correct. I was present to expose the anterior disc space.     Andry Du MD

## 2023-08-05 ENCOUNTER — APPOINTMENT (OUTPATIENT)
Dept: GENERAL RADIOLOGY | Facility: HOSPITAL | Age: 53
End: 2023-08-05
Attending: NURSE PRACTITIONER
Payer: COMMERCIAL

## 2023-08-05 PROCEDURE — 74018 RADEX ABDOMEN 1 VIEW: CPT | Performed by: NURSE PRACTITIONER

## 2023-08-05 PROCEDURE — 99232 SBSQ HOSP IP/OBS MODERATE 35: CPT | Performed by: SURGERY

## 2023-08-05 RX ORDER — BISACODYL 10 MG
10 SUPPOSITORY, RECTAL RECTAL
Status: DISCONTINUED | OUTPATIENT
Start: 2023-08-05 | End: 2023-08-08

## 2023-08-05 RX ORDER — METOCLOPRAMIDE HYDROCHLORIDE 5 MG/ML
10 INJECTION INTRAMUSCULAR; INTRAVENOUS 3 TIMES DAILY
Status: DISCONTINUED | OUTPATIENT
Start: 2023-08-05 | End: 2023-08-08

## 2023-08-05 RX ORDER — SODIUM CHLORIDE 9 MG/ML
INJECTION, SOLUTION INTRAVENOUS CONTINUOUS
Status: DISCONTINUED | OUTPATIENT
Start: 2023-08-05 | End: 2023-08-08

## 2023-08-05 NOTE — PHYSICAL THERAPY NOTE
PHYSICAL THERAPY TREATMENT NOTE - INPATIENT     Room Number: 415/415-A       Presenting Problem: s/p L5-S1 ALIF with posterior instrumentation 8. 1.23    Problem List  Active Problems:    Lumbar radiculopathy      PHYSICAL THERAPY ASSESSMENT   Chart reviewed. RN Mihir Newsome approved participation in physical therapy. Sister in room during session. Lisette Hilliard PPE worn by therapist: mask and gloves. Patient was wearing a mask during session. Patient presented in bed with 3/10 pain. Patient with good  progress towards goals during this session. Education provided on bed mobility, transfers and amb follow BLT. Patient with good carryover. Patient improved her gait pattern with cues. She inc all activity tolerance today. Stoop platform with RW navigate with Min A with cues for safety. Bed mobility: Supervision  Transfers: Supervision  Gait Assistance: Supervision  Distance (ft): 150  Assistive Device: Rolling walker  Pattern: Shuffle     Patient was left in bedside chair at end of session with all needs in reach. The patient's Approx Degree of Impairment: 41.77% has been calculated based on documentation in the [facility] '6 clicks' Inpatient Basic Mobility Short Form. Research supports that patients with this level of impairment may benefit from Home with no services. Patient has 24 hrs care at home. RW was given to take home and instruction on safety with mobility given. RN aware of patient status post session. DISCHARGE RECOMMENDATIONS  PT Discharge Recommendations: Home;24 hour care/supervision     PLAN  PT Treatment Plan: Don/doff brace; Patient education;Gait training    SUBJECTIVE  I am ready for therapy. OBJECTIVE  Precautions: Spine;Lumbar brace    WEIGHT BEARING RESTRICTION  Weight Bearing Restriction: None                PAIN ASSESSMENT   Rating: 3  Location: back  Management Techniques: Activity promotion; Body mechanics;Repositioning    BALANCE Static Sitting: Good  Dynamic Sitting: Good           Static Standing: Fair +  Dynamic Standing: Fair    ACTIVITY TOLERANCE  Pulse: 85  Heart Rate Source: Monitor  Resp: 18  BP: 96/60  BP Location: Right arm  BP Method: Automatic  Patient Position: Sitting    O2 WALK  Oxygen Therapy  SPO2% on Room Air at Rest: 98    AM-PAC '6-Clicks' INPATIENT SHORT FORM - BASIC MOBILITY  How much difficulty does the patient currently have. .. Patient Difficulty: Turning over in bed (including adjusting bedclothes, sheets and blankets)?: A Little   Patient Difficulty: Sitting down on and standing up from a chair with arms (e.g., wheelchair, bedside commode, etc.): None   Patient Difficulty: Moving from lying on back to sitting on the side of the bed?: A Little   How much help from another person does the patient currently need. .. Help from Another: Moving to and from a bed to a chair (including a wheelchair)?: A Little   Help from Another: Need to walk in hospital room?: A Little   Help from Another: Climbing 3-5 steps with a railing?: A Little     AM-PAC Score:  Raw Score: 19   Approx Degree of Impairment: 41.77%   Standardized Score (AM-PAC Scale): 45.44   CMS Modifier (G-Code): CK      Additional information:     THERAPEUTIC EXERCISES  Lower Extremity Alternating marching  Ankle pumps  Knee extension     Position Sitting       Patient End of Session: Up in chair; All patient questions and concerns addressed;RN aware of session/findings;Call light within reach; Needs met; Family present    CURRENT GOALS   Goals to be met by: 8/3/23  Patient Goal Patient's self-stated goal is: to walk   Goal #1 Patient is able to demonstrate supine - sit EOB @ level: supervision     Goal #1   Current Status SBA   Goal #2 Patient is able to demonstrate transfers Sit to/from Stand at assistance level: supervision with walker - rolling     Goal #2  Current Status SBA   Goal #3 Patient is able to ambulate 100 feet with assist device: walker - rolling at assistance level: supervision   Goal #3   Current Status 150 RW SBA   Goal #4 Patient will negotiate 2 stairs/one curb w/ assistive device and supervision   Goal #4   Current Status Min A with RW platform step   Goal #5 Patient to demonstrate independence with home activity/exercise instructions provided to patient in preparation for discharge.    Goal #5   Current Status In progress   Goal #6    Goal #6  Current Status      Gait Training: 15 minutes  Therapeutic Activity:  minutes  Neuromuscular Re-education:  minutes  Therapeutic Exercise: 15 minutes   [place] Repositioning:  minutes  Manual Therapy:  minutes  Can add/delete any of these

## 2023-08-05 NOTE — PLAN OF CARE
Patient is alert and oriented. Dressing and anterior and posterior in place. LSO brace worn at all times. Tele in place with no calls. Voiding. Precocet given for pain management. Zofran given for nausea x1. Saline locked. Family at bedside. Call light within reach. Frequent rounding done.      Problem: Patient Centered Care  Goal: Patient preferences are identified and integrated in the patient's plan of care  Description: Interventions:  - What would you like us to know as we care for you?   - Provide timely, complete, and accurate information to patient/family  - Incorporate patient and family knowledge, values, beliefs, and cultural backgrounds into the planning and delivery of care  - Encourage patient/family to participate in care and decision-making at the level they choose  - Honor patient and family perspectives and choices  Outcome: Progressing     Problem: PAIN - ADULT  Goal: Verbalizes/displays adequate comfort level or patient's stated pain goal  Description: INTERVENTIONS:  - Encourage pt to monitor pain and request assistance  - Assess pain using appropriate pain scale  - Administer analgesics based on type and severity of pain and evaluate response  - Implement non-pharmacological measures as appropriate and evaluate response  - Consider cultural and social influences on pain and pain management  - Manage/alleviate anxiety  - Utilize distraction and/or relaxation techniques  - Monitor for opioid side effects  - Notify MD/LIP if interventions unsuccessful or patient reports new pain  - Anticipate increased pain with activity and pre-medicate as appropriate  Outcome: Progressing     Problem: RISK FOR INFECTION - ADULT  Goal: Absence of fever/infection during anticipated neutropenic period  Description: INTERVENTIONS  - Monitor WBC  - Administer growth factors as ordered  - Implement neutropenic guidelines  Outcome: Progressing     Problem: SAFETY ADULT - FALL  Goal: Free from fall injury  Description: INTERVENTIONS:  - Assess pt frequently for physical needs  - Identify cognitive and physical deficits and behaviors that affect risk of falls.   - [place] fall precautions as indicated by assessment.  - Educate pt/family on patient safety including physical limitations  - Instruct pt to call for assistance with activity based on assessment  - Modify environment to reduce risk of injury  - Provide assistive devices as appropriate  - Consider OT/PT consult to assist with strengthening/mobility  - Encourage toileting schedule  Outcome: Progressing

## 2023-08-06 ENCOUNTER — APPOINTMENT (OUTPATIENT)
Dept: GENERAL RADIOLOGY | Facility: HOSPITAL | Age: 53
End: 2023-08-06
Attending: SURGERY
Payer: COMMERCIAL

## 2023-08-06 LAB
DEPRECATED HBV CORE AB SER IA-ACNC: 16.8 NG/ML
IRON SERPL-MCNC: 19 UG/DL

## 2023-08-06 PROCEDURE — 74019 RADEX ABDOMEN 2 VIEWS: CPT | Performed by: SURGERY

## 2023-08-06 NOTE — PHYSICAL THERAPY NOTE
PHYSICAL THERAPY TREATMENT NOTE - INPATIENT     Room Number: 415/415-A       Presenting Problem: s/p L5-S1 ALIF with posterior instrumentation 8. 1.    Problem List  Active Problems:    Lumbar radiculopathy      PHYSICAL THERAPY ASSESSMENT   Chart reviewed. RN approved participation in physical therapy. Lisette Hilliard PPE worn by therapist: mask, gloves, and goggles. Patient was wearing a mask during session. Patient presented in bed with 6/10 pain. Patient with good  progress towards goals during this session. Education provided on Spine precautions, Physical therapy plan of care, and physiological benefits of out of bed mobility. Patient with good carryover. Bed mobility: Min assist  Transfers: Min assist  Gait Assistance: Contact guard assist  Distance (ft): 2 x 100  Assistive Device: Rolling walker  Pattern: Shuffle          Pt seen daily. Min a for bed mobility and transfer. Extra time provide to complete task. EOB sitting balance activity with emphasis on core stabilization. Spinal precautions reviewed;education;all questions and concerns addressed. Pt amb 2 x 100 ft with RW and CGA. There ex. Gentle balanc eactivity performed to maximize safety with functional mobility. Patient was left in bedside chair at end of session with all needs in reach. The patient's Approx Degree of Impairment: 41.77% has been calculated based on documentation in the [facility] '6 clicks' Inpatient Basic Mobility Short Form. Research supports that patients with this level of impairment may benefit from Home with home health PT.  RN aware of patient status post session. DISCHARGE RECOMMENDATIONS  PT Discharge Recommendations: Home;24 hour care/supervision     PLAN  PT Treatment Plan: Don/doff brace; Patient education;Gait training    SUBJECTIVE  Pt reports being ready for PT RX    OBJECTIVE  Precautions: Spine;Lumbar brace    WEIGHT BEARING RESTRICTION  Weight Bearing Restriction: None                PAIN ASSESSMENT   Ratin  Location: back  Management Techniques: Activity promotion; Body mechanics;Repositioning    BALANCE                                                                                                                       Static Sitting: Good  Dynamic Sitting: Good           Static Standing: Fair +  Dynamic Standing: Fair    ACTIVITY TOLERANCE                         O2 WALK       AM-PAC '6-Clicks' INPATIENT SHORT FORM - BASIC MOBILITY  How much difficulty does the patient currently have. .. Patient Difficulty: Turning over in bed (including adjusting bedclothes, sheets and blankets)?: A Little   Patient Difficulty: Sitting down on and standing up from a chair with arms (e.g., wheelchair, bedside commode, etc.): None   Patient Difficulty: Moving from lying on back to sitting on the side of the bed?: A Little   How much help from another person does the patient currently need. .. Help from Another: Moving to and from a bed to a chair (including a wheelchair)?: A Little   Help from Another: Need to walk in hospital room?: A Little   Help from Another: Climbing 3-5 steps with a railing?: A Little     AM-PAC Score:  Raw Score: 19   Approx Degree of Impairment: 41.77%   Standardized Score (AM-PAC Scale): 45.44   CMS Modifier (G-Code): CK      Additional information:     THERAPEUTIC EXERCISES  Lower Extremity Ankle pumps  Glut sets  Quad sets     Position Supine       Patient End of Session: Up in chair; All patient questions and concerns addressed;RN aware of session/findings;Call light within reach; Needs met; Family present    CURRENT GOALS     Patient Goal Patient's self-stated goal is: to walk   Goal #1 Patient is able to demonstrate supine - sit EOB @ level: supervision     Goal #1   Current Status Min a   Goal #2 Patient is able to demonstrate transfers Sit to/from Stand at assistance level: supervision with walker - rolling     Goal #2  Current Status Min a   Goal #3 Patient is able to ambulate 100 feet with assist device: walker - rolling at assistance level: supervision   Goal #3   Current Status Pt amb 2 x 100 ft with RW and CGA   Goal #4 Patient will negotiate 2 stairs/one curb w/ assistive device and supervision   Goal #4   Current Status NT   Goal #5 Patient to demonstrate independence with home activity/exercise instructions provided to patient in preparation for discharge. Goal #5   Current Status In progress   Goal #6    Goal #6  Current Status    Gait -15 minutes; There ex-15 minutes.

## 2023-08-06 NOTE — CONSULTS
[facility]    PATIENT'S NAME: Seema Clifton [facility]   ATTENDING PHYSICIAN: Amberly Dias MD   CONSULTING PHYSICIAN: Morgan Hartman MD   PATIENT ACCOUNT#:   [id number]    LOCATION:  [facility] [address] #:   [id number]       YOB: 1970  ADMISSION DATE:       08/01/2023      CONSULT DATE:  08/05/2023    REPORT OF CONSULTATION      REASON FOR CONSULTATION:  Colonic distention. HISTORY OF PRESENT ILLNESS:  The patient is a pleasant 50-year-old female who underwent L5-S1 lumbar fusion. She has a history of hyperlipidemia, depression, lumbar radiculopathy. I am asked to evaluate for abdominal distention and colonic distention. PAST MEDICAL HISTORY:  Above, difficult intubation, gastroplasty for weight loss, anxiety, alopecia, cholecystectomy, sleeve gastrectomy, breast reduction. MEDICATIONS:  Please see reconciliation. ALLERGIES:  Strawberry and shellfish. SOCIAL HISTORY:  She is . Does not smoke or drink. REVIEW OF SYSTEMS:  Significant for abdominal distention, back pain. PHYSICAL EXAMINATION:    GENERAL:  She is a pleasant, appears comfortable, in no distress, states she passed a lot of colitis last night after she was n.p.o. She is alert, oriented x3. VITAL SIGNS:  Stable. HEENT:  Sclerae nonicteric. EOMI, PERRLA. NECK:  Supple without lymphadenopathy. LUNGS:  Clear to auscultation. No rhonchi or wheezing. HEART:  Regular rate and rhythm. No murmur, rubs, or gallops. ABDOMEN:  Her brace is in place. Her abdomen feels nondistended. Positive bowel sounds. EXTREMITIES:  Without clubbing, cyanosis, or edema. LABORATORY DATA:  Recent labs from 08/04 normal.      X-ray reviewed. Repeat x-ray pending from today. ASSESSMENT:  Colonic pseudoobstruction. No prior history. PLAN:  Physical Therapy to help with ambulation. Will restart clear liquid diet as she is now passing flatness.     Thank you for this consultation.     Dictated By Graciela Gardner MD  d: 08/06/2023 09:05:33  t: 08/06/2023 13:19:55  Job [id number]  /

## 2023-08-06 NOTE — PROGRESS NOTES
General surgery   Chart reviewed, full consult to follow    #post op ileus vs pseudo obstruction  -npo, reglan, dulcolax supp, hold narcotics  Repeat 2 view tomorrow.  May need colonic decompression if conservative measures fail

## 2023-08-06 NOTE — PLAN OF CARE
Alert and oriented x4. Anterior + posterior interbody fusion. POD 5. LSO brace on at all times. 1 assist walker out of the bed. NPO. Imaging shows ileus. Iv fluids. General surgery to follow. Problem: Patient Centered Care  Goal: Patient preferences are identified and integrated in the patient's plan of care  Description: Interventions:  - What would you like us to know as we care for you?  Home with roommates  - Provide timely, complete, and accurate information to patient/family  - Incorporate patient and family knowledge, values, beliefs, and cultural backgrounds into the planning and delivery of care  - Encourage patient/family to participate in care and decision-making at the level they choose  - Honor patient and family perspectives and choices  Outcome: Progressing     Problem: PAIN - ADULT  Goal: Verbalizes/displays adequate comfort level or patient's stated pain goal  Description: INTERVENTIONS:  - Encourage pt to monitor pain and request assistance  - Assess pain using appropriate pain scale  - Administer analgesics based on type and severity of pain and evaluate response  - Implement non-pharmacological measures as appropriate and evaluate response  - Consider cultural and social influences on pain and pain management  - Manage/alleviate anxiety  - Utilize distraction and/or relaxation techniques  - Monitor for opioid side effects  - Notify MD/LIP if interventions unsuccessful or patient reports new pain  - Anticipate increased pain with activity and pre-medicate as appropriate  Outcome: Progressing     Problem: RISK FOR INFECTION - ADULT  Goal: Absence of fever/infection during anticipated neutropenic period  Description: INTERVENTIONS  - Monitor WBC  - Administer growth factors as ordered  - Implement neutropenic guidelines  Outcome: Progressing     Problem: SAFETY ADULT - FALL  Goal: Free from fall injury  Description: INTERVENTIONS:  - Assess pt frequently for physical needs  - Identify cognitive and physical deficits and behaviors that affect risk of falls.   - [place] fall precautions as indicated by assessment.  - Educate pt/family on patient safety including physical limitations  - Instruct pt to call for assistance with activity based on assessment  - Modify environment to reduce risk of injury  - Provide assistive devices as appropriate  - Consider OT/PT consult to assist with strengthening/mobility  - Encourage toileting schedule  Outcome: Progressing     Problem: DISCHARGE PLANNING  Goal: Discharge to home or other facility with appropriate resources  Description: INTERVENTIONS:  - Identify barriers to discharge w/pt and caregiver  - Include patient/family/discharge partner in discharge planning  - Arrange for needed discharge resources and transportation as appropriate  - Identify discharge learning needs (meds, wound care, etc)  - Arrange for interpreters to assist at discharge as needed  - Consider post-discharge preferences of patient/family/discharge partner  - Complete POLST form as appropriate  - Assess patient's ability to be responsible for managing their own health  - Refer to Case Management Department for coordinating discharge planning if the patient needs post-hospital services based on physician/LIP order or complex needs related to functional status, cognitive ability or social support system  Outcome: Progressing

## 2023-08-07 ENCOUNTER — APPOINTMENT (OUTPATIENT)
Dept: CV DIAGNOSTICS | Facility: HOSPITAL | Age: 53
End: 2023-08-07
Attending: INTERNAL MEDICINE
Payer: COMMERCIAL

## 2023-08-07 PROCEDURE — 93306 TTE W/DOPPLER COMPLETE: CPT | Performed by: INTERNAL MEDICINE

## 2023-08-07 PROCEDURE — 99232 SBSQ HOSP IP/OBS MODERATE 35: CPT | Performed by: SURGERY

## 2023-08-07 NOTE — PLAN OF CARE
Alert and oriented x4. Anterior and lumbar interbody fusion. POD 6. LSO brace on at all times. Tele. Clear liquid diet. Imaging ileus. 1 assist walker. No complaints of pain. Voiding. Pass gas tonight. Plan for home once cleared. Possible repeat x-ray in morning for confirmation. Problem: Patient Centered Care  Goal: Patient preferences are identified and integrated in the patient's plan of care  Description: Interventions:  - What would you like us to know as we care for you?  Home with roommate.  - Provide timely, complete, and accurate information to patient/family  - Incorporate patient and family knowledge, values, beliefs, and cultural backgrounds into the planning and delivery of care  - Encourage patient/family to participate in care and decision-making at the level they choose  - Honor patient and family perspectives and choices  Outcome: Progressing     Problem: PAIN - ADULT  Goal: Verbalizes/displays adequate comfort level or patient's stated pain goal  Description: INTERVENTIONS:  - Encourage pt to monitor pain and request assistance  - Assess pain using appropriate pain scale  - Administer analgesics based on type and severity of pain and evaluate response  - Implement non-pharmacological measures as appropriate and evaluate response  - Consider cultural and social influences on pain and pain management  - Manage/alleviate anxiety  - Utilize distraction and/or relaxation techniques  - Monitor for opioid side effects  - Notify MD/LIP if interventions unsuccessful or patient reports new pain  - Anticipate increased pain with activity and pre-medicate as appropriate  Outcome: Progressing     Problem: RISK FOR INFECTION - ADULT  Goal: Absence of fever/infection during anticipated neutropenic period  Description: INTERVENTIONS  - Monitor WBC  - Administer growth factors as ordered  - Implement neutropenic guidelines  Outcome: Progressing     Problem: SAFETY ADULT - FALL  Goal: Free from fall injury  Description: INTERVENTIONS:  - Assess pt frequently for physical needs  - Identify cognitive and physical deficits and behaviors that affect risk of falls.   - [place] fall precautions as indicated by assessment.  - Educate pt/family on patient safety including physical limitations  - Instruct pt to call for assistance with activity based on assessment  - Modify environment to reduce risk of injury  - Provide assistive devices as appropriate  - Consider OT/PT consult to assist with strengthening/mobility  - Encourage toileting schedule  Outcome: Progressing     Problem: DISCHARGE PLANNING  Goal: Discharge to home or other facility with appropriate resources  Description: INTERVENTIONS:  - Identify barriers to discharge w/pt and caregiver  - Include patient/family/discharge partner in discharge planning  - Arrange for needed discharge resources and transportation as appropriate  - Identify discharge learning needs (meds, wound care, etc)  - Arrange for interpreters to assist at discharge as needed  - Consider post-discharge preferences of patient/family/discharge partner  - Complete POLST form as appropriate  - Assess patient's ability to be responsible for managing their own health  - Refer to Case Management Department for coordinating discharge planning if the patient needs post-hospital services based on physician/LIP order or complex needs related to functional status, cognitive ability or social support system  Outcome: Progressing

## 2023-08-07 NOTE — PROGRESS NOTES
[facility]   a division of  Anna Leone MD, Anais lAmazan, SUGEY Rojaszeeshan Pena, [address], [address] [facility]  Office [phone]  Fax [phone]       No C/O of CP SOB NV. Pain controlled . Leg symptoms improved  AVSS  Dressing is CDI  Passing gas. Had  BM Saturday night  Motor to bilateral LE at baseline 5/5 throughout  Nv intact  Homans neg    S/p L5-S1 ALIF with posterior    Passing gas. Dr. Dorothy Dawn advancing diet to full liquid  Mobilize  PT/OT  NO lifting over 15 pounds or ROM of the lumbar spine  Lumbar brace on at all times  May shower 48 hours after surgery  with incision covered  Daily dressing changes to start on POD 2 or upon discharge  Please send patient home with gauze and tape  rx in the chart  May d/c to home from ortho standpoint.  Await general sx approval and Dr. Jenifer Perez  Please change dressing before the patient leaves    F/u in two weeks

## 2023-08-07 NOTE — PROGRESS NOTES
08/07/23 1513   Clinical Encounter Type   Visited With Patient; Family   Surgical Visit Post-op   Family Spiritual Encounters   Family Coping Accepting   Family Participation in Care Consistently   Taxonomy   Intended Effects Establish rapport and connectedness   Methods Offer spiritual/Restorationism support   Interventions Discuss concerns; Acknowledge current situation     Summary:  visited with patient and friend, other chaplain resident observed visit. Patient expressed desire to go home for recovery, shared about her family and nieces. Patient declined prayer and sacraments. Spiritual Care support can be requested via an Epic consult.    -Stefan Cortsé, Chaplain Resident.

## 2023-08-07 NOTE — PLAN OF CARE
Problem: Patient Centered Care  Goal: Patient preferences are identified and integrated in the patient's plan of care  Description: Interventions:  - Provide timely, complete, and accurate information to patient/family  - Incorporate patient and family knowledge, values, beliefs, and cultural backgrounds into the planning and delivery of care  - Encourage patient/family to participate in care and decision-making at the level they choose  - Honor patient and family perspectives and choices  Outcome: Progressing     Problem: PAIN - ADULT  Goal: Verbalizes/displays adequate comfort level or patient's stated pain goal  Description: INTERVENTIONS:  - Encourage pt to monitor pain and request assistance  - Assess pain using appropriate pain scale  - Administer analgesics based on type and severity of pain and evaluate response  - Implement non-pharmacological measures as appropriate and evaluate response  - Consider cultural and social influences on pain and pain management  - Manage/alleviate anxiety  - Utilize distraction and/or relaxation techniques  - Monitor for opioid side effects  - Notify MD/LIP if interventions unsuccessful or patient reports new pain  - Anticipate increased pain with activity and pre-medicate as appropriate  Outcome: Progressing     Problem: RISK FOR INFECTION - ADULT  Goal: Absence of fever/infection during anticipated neutropenic period  Description: INTERVENTIONS  - Monitor WBC  - Administer growth factors as ordered  - Implement neutropenic guidelines  Outcome: Progressing     Problem: SAFETY ADULT - FALL  Goal: Free from fall injury  Description: INTERVENTIONS:  - Assess pt frequently for physical needs  - Identify cognitive and physical deficits and behaviors that affect risk of falls.   - [place] fall precautions as indicated by assessment.  - Educate pt/family on patient safety including physical limitations  - Instruct pt to call for assistance with activity based on assessment  - Modify environment to reduce risk of injury  - Provide assistive devices as appropriate  - Consider OT/PT consult to assist with strengthening/mobility  - Encourage toileting schedule  Outcome: Progressing     Problem: DISCHARGE PLANNING  Goal: Discharge to home or other facility with appropriate resources  Description: INTERVENTIONS:  - Identify barriers to discharge w/pt and caregiver  - Include patient/family/discharge partner in discharge planning  - Arrange for needed discharge resources and transportation as appropriate  - Identify discharge learning needs (meds, wound care, etc)  - Arrange for interpreters to assist at discharge as needed  - Consider post-discharge preferences of patient/family/discharge partner  - Complete POLST form as appropriate  - Assess patient's ability to be responsible for managing their own health  - Refer to Case Management Department for coordinating discharge planning if the patient needs post-hospital services based on physician/LIP order or complex needs related to functional status, cognitive ability or social support system  Outcome: Progressing    Patient is A&Ox4, encourage incentive spirometer, pain control, ambulation and up to chair. Surgical dressings clean, dry, intact. LSO in place at all time. Diet progressed today to full liquid diet. Echo to be obtained this morning. Discharge home pending medical clearance. Call light within reach. SCDs and teds in place bilaterally. Continue to monitor pt.     1449: Pt requested to page Dr Radha Garcia as patient is hungry and tolerated full liquid diet for breakfast and lunch. MD notified pt has had several episodes of loose/watery stool. MD states can advance to soft/low fiber diet. Continue to monitor pt.     1600: MD Mojica notified pt IV came out and patient insistent on not having IV replaced. MD states does not need IV put back in.  MD aware patient tolerating soft/low fiber diet and having bowel movements and that she has not wanted her IV reglan as scheduled. Continue to monitor pt.

## 2023-08-08 VITALS
DIASTOLIC BLOOD PRESSURE: 55 MMHG | RESPIRATION RATE: 16 BRPM | HEIGHT: 63 IN | BODY MASS INDEX: 37.74 KG/M2 | HEART RATE: 83 BPM | TEMPERATURE: 98 F | WEIGHT: 213 LBS | SYSTOLIC BLOOD PRESSURE: 100 MMHG | OXYGEN SATURATION: 97 %

## 2023-08-08 PROCEDURE — 99232 SBSQ HOSP IP/OBS MODERATE 35: CPT | Performed by: SURGERY

## 2023-08-08 RX ORDER — PANTOPRAZOLE SODIUM 20 MG/1
20 TABLET, DELAYED RELEASE ORAL
Status: SHIPPED | COMMUNITY
Start: 2023-08-08

## 2023-08-08 RX ORDER — OXYCODONE HYDROCHLORIDE AND ACETAMINOPHEN 5; 325 MG/1; MG/1
1 TABLET ORAL EVERY 4 HOURS PRN
Refills: 0 | Status: SHIPPED | COMMUNITY
Start: 2023-08-08

## 2023-08-08 NOTE — PROGRESS NOTES
General surgery    Patient doing well. Having bowel movements. On a soft diet. Ileus appears resolved.   We will sign off please call if needed

## 2023-08-08 NOTE — DISCHARGE INSTRUCTIONS
Mobilize, up with walker as much as tolerated  NO lifting over 15 pounds or range of motion of the lumbar spine  Lumbar brace on at all times  May shower 48 hours after surgery  with incision covered  Daily dressing changes to start on POD 2 with gauze and paper tape  F/u in two weeks. Monitor incision for any signs of infection such as redness, inflammation, pus like drainage coming out of your incision or fever. May apply ice to incision to reduce swelling and help control pain.

## 2023-08-08 NOTE — PROGRESS NOTES
[facility] division of  Efrain Martinez MD, Alia Geller, [place]  Michael Evans, [address], [address] [facility]  Office [phone]  Fax [phone]       No C/O of CP SOB NV. Pain controlled. Leg symptoms improved  AVSS  Passing gas and having bowel movements  Dressing is CDI  Motor to bilateral LE at baseline 5/5 throughout  Nv intact  Homans neg    S/p L5-S1 ALIF with posterior    General surgery signed off. Having bowel movements and on soft diet.  Advance diet as tolerated  Mobilize  PT/OT  NO lifting over 15 pounds or ROM of the lumbar spine  Lumbar brace on at all times  May shower 48 hours after surgery  with incision covered  Daily dressing changes to start on POD 2 or upon discharge  Please send patient home with gauze and tape  rx in the chart  May d/c to home from ortho standpoint  Please change dressing before the patient leaves    F/u in two weeks

## 2023-08-08 NOTE — PLAN OF CARE
Problem: Patient Centered Care  Goal: Patient preferences are identified and integrated in the patient's plan of care  Description: Interventions:  - Provide timely, complete, and accurate information to patient/family  - Incorporate patient and family knowledge, values, beliefs, and cultural backgrounds into the planning and delivery of care  - Encourage patient/family to participate in care and decision-making at the level they choose  - Honor patient and family perspectives and choices  8/8/2023 1146 by King Doty RN  Outcome: Adequate for Discharge  8/8/2023 0931 by King Doty RN  Outcome: Progressing  8/8/2023 0931 by King Doty RN  Outcome: Progressing     Problem: PAIN - ADULT  Goal: Verbalizes/displays adequate comfort level or patient's stated pain goal  Description: INTERVENTIONS:  - Encourage pt to monitor pain and request assistance  - Assess pain using appropriate pain scale  - Administer analgesics based on type and severity of pain and evaluate response  - Implement non-pharmacological measures as appropriate and evaluate response  - Consider cultural and social influences on pain and pain management  - Manage/alleviate anxiety  - Utilize distraction and/or relaxation techniques  - Monitor for opioid side effects  - Notify MD/LIP if interventions unsuccessful or patient reports new pain  - Anticipate increased pain with activity and pre-medicate as appropriate  8/8/2023 1146 by King Doty RN  Outcome: Adequate for Discharge  8/8/2023 0931 by King Doty RN  Outcome: Progressing  8/8/2023 0931 by King Doty RN  Outcome: Progressing     Problem: RISK FOR INFECTION - ADULT  Goal: Absence of fever/infection during anticipated neutropenic period  Description: INTERVENTIONS  - Monitor WBC  - Administer growth factors as ordered  - Implement neutropenic guidelines  8/8/2023 1146 by King Doty RN  Outcome: Adequate for Discharge  8/8/2023 5961 by Alis Calles RN  Outcome: Progressing  8/8/2023 0931 by Alis Calles RN  Outcome: Progressing     Problem: SAFETY ADULT - FALL  Goal: Free from fall injury  Description: INTERVENTIONS:  - Assess pt frequently for physical needs  - Identify cognitive and physical deficits and behaviors that affect risk of falls.   - [place] fall precautions as indicated by assessment.  - Educate pt/family on patient safety including physical limitations  - Instruct pt to call for assistance with activity based on assessment  - Modify environment to reduce risk of injury  - Provide assistive devices as appropriate  - Consider OT/PT consult to assist with strengthening/mobility  - Encourage toileting schedule  8/8/2023 1146 by Alis Calles RN  Outcome: Adequate for Discharge  8/8/2023 0931 by Alis Calles RN  Outcome: Progressing  8/8/2023 0931 by Alis Calles RN  Outcome: Progressing     Problem: DISCHARGE PLANNING  Goal: Discharge to home or other facility with appropriate resources  Description: INTERVENTIONS:  - Identify barriers to discharge w/pt and caregiver  - Include patient/family/discharge partner in discharge planning  - Arrange for needed discharge resources and transportation as appropriate  - Identify discharge learning needs (meds, wound care, etc)  - Arrange for interpreters to assist at discharge as needed  - Consider post-discharge preferences of patient/family/discharge partner  - Complete POLST form as appropriate  - Assess patient's ability to be responsible for managing their own health  - Refer to Case Management Department for coordinating discharge planning if the patient needs post-hospital services based on physician/LIP order or complex needs related to functional status, cognitive ability or social support system  8/8/2023 1146 by Alis Calles RN  Outcome: Adequate for Discharge  8/8/2023 0931 by Alis Calles RN  Outcome: Progressing  8/8/2023 0931 by Laverne Wilson RN  Outcome: Progressing    Patient is cleared for discharge per general surgery, neurosx, medical dr and physical therapy. Patient has walker for home. Given extra dressing supplies. Taking home all belongings including brace. Instructed patient on all information in after visit summary including dressing instructions, wound care, showering, when to follow-up with Dr Vadim Gibson and symptoms of infection and when to call the provider. Patient given paper copies of prescriptions, educated when next doses were due. Patient has no questions, IV has been removed, assist to car, safe to discharge.

## 2023-08-08 NOTE — PLAN OF CARE
Problem: Patient Centered Care  Goal: Patient preferences are identified and integrated in the patient's plan of care  Description: Interventions:  - Provide timely, complete, and accurate information to patient/family  - Incorporate patient and family knowledge, values, beliefs, and cultural backgrounds into the planning and delivery of care  - Encourage patient/family to participate in care and decision-making at the level they choose  - Honor patient and family perspectives and choices  8/8/2023 0931 by Sharyn Rivera RN  Outcome: Progressing  8/8/2023 0931 by Sharyn Rivera RN  Outcome: Progressing     Problem: PAIN - ADULT  Goal: Verbalizes/displays adequate comfort level or patient's stated pain goal  Description: INTERVENTIONS:  - Encourage pt to monitor pain and request assistance  - Assess pain using appropriate pain scale  - Administer analgesics based on type and severity of pain and evaluate response  - Implement non-pharmacological measures as appropriate and evaluate response  - Consider cultural and social influences on pain and pain management  - Manage/alleviate anxiety  - Utilize distraction and/or relaxation techniques  - Monitor for opioid side effects  - Notify MD/LIP if interventions unsuccessful or patient reports new pain  - Anticipate increased pain with activity and pre-medicate as appropriate  8/8/2023 0931 by Sharyn Rivera RN  Outcome: Progressing  8/8/2023 0931 by Sharyn Rivera RN  Outcome: Progressing     Problem: RISK FOR INFECTION - ADULT  Goal: Absence of fever/infection during anticipated neutropenic period  Description: INTERVENTIONS  - Monitor WBC  - Administer growth factors as ordered  - Implement neutropenic guidelines  8/8/2023 0931 by Sharyn Rivera RN  Outcome: Progressing  8/8/2023 0931 by Sharyn Rivera RN  Outcome: Progressing     Problem: SAFETY ADULT - FALL  Goal: Free from fall injury  Description: INTERVENTIONS:  - Assess pt frequently for physical needs  - Identify cognitive and physical deficits and behaviors that affect risk of falls. - [place] fall precautions as indicated by assessment.  - Educate pt/family on patient safety including physical limitations  - Instruct pt to call for assistance with activity based on assessment  - Modify environment to reduce risk of injury  - Provide assistive devices as appropriate  - Consider OT/PT consult to assist with strengthening/mobility  - Encourage toileting schedule  8/8/2023 0931 by Renuka Santos RN  Outcome: Progressing  8/8/2023 0931 by Renuka Santos RN  Outcome: Progressing     Problem: DISCHARGE PLANNING  Goal: Discharge to home or other facility with appropriate resources  Description: INTERVENTIONS:  - Identify barriers to discharge w/pt and caregiver  - Include patient/family/discharge partner in discharge planning  - Arrange for needed discharge resources and transportation as appropriate  - Identify discharge learning needs (meds, wound care, etc)  - Arrange for interpreters to assist at discharge as needed  - Consider post-discharge preferences of patient/family/discharge partner  - Complete POLST form as appropriate  - Assess patient's ability to be responsible for managing their own health  - Refer to Case Management Department for coordinating discharge planning if the patient needs post-hospital services based on physician/LIP order or complex needs related to functional status, cognitive ability or social support system  8/8/2023 0931 by Renuka Santos RN  Outcome: Progressing  8/8/2023 0931 by Renuka Santos RN  Outcome: Progressing    Patient is A&Ox4, room air, encourage pain control, incentive spirometer, ambulation and up to chair. Surgical dressings clean, dry, intact. LSO applied. SCD and teds in place bilaterally.  Discharge plan pending medical clearance; patient has been cleared per physical therapy, ortho and general surgery, pt has been tolerating soft/low fiber diet. Continue to monitor pt.

## 2023-08-08 NOTE — PHYSICAL THERAPY NOTE
PHYSICAL THERAPY TREATMENT NOTE - INPATIENT     Room Number: 415/415-A       Presenting Problem: s/p L5-S1 ALIF with posterior instrumentation 8. 1.23    Problem List  Active Problems:    Lumbar radiculopathy      PHYSICAL THERAPY ASSESSMENT   Chart reviewed. RN approved participation in physical therapy. Samantha Maggie PPE worn by therapist: mask, gloves, and goggles. Patient was wearing a mask during session. Patient presented in bed with 6/10 pain. Patient with good  progress towards goals during this session. Education provided on Spine precautions, Physical therapy plan of care, and physiological benefits of out of bed mobility. Patient with good carryover. Bed mobility: Min assist  Transfers: Min assist  Gait Assistance: Contact guard assist  Distance (ft): 2 x 125  Assistive Device: Rolling walker  Pattern: Shuffle          Pt seen daily. Min a for bed mobility and transfer. Extra time provide to complete task. EOB sitting balance activity with emphasis on core stabilization. Spinal precautions reviewed;education;all questions and concerns addressed. Pt amb 2 x 125 ft with RW and CGA. There ex. Gentle balance activity performed to maximize safety with functional mobility. Navigated 2 stairs with CGA. Family present;family education;all questions and concerns addressed. Patient was left in bedside chair at end of session with all needs in reach. The patient's Approx Degree of Impairment: 41.77% has been calculated based on documentation in the [facility] '6 clicks' Inpatient Basic Mobility Short Form. Research supports that patients with this level of impairment may benefit from Home with home health PT.  RN aware of patient status post session. DISCHARGE RECOMMENDATIONS  PT Discharge Recommendations: Home;24 hour care/supervision     PLAN  PT Treatment Plan: Don/doff brace; Bed mobility; Body mechanics;Gait training    SUBJECTIVE  Pt reports being ready for PT RX    OBJECTIVE  Precautions: Spine;Lumbar brace    WEIGHT BEARING RESTRICTION  Weight Bearing Restriction: None                PAIN ASSESSMENT   Ratin  Location: back  Management Techniques: Activity promotion; Body mechanics;Repositioning    BALANCE                                                                                                                       Static Sitting: Good  Dynamic Sitting: Good           Static Standing: Fair +  Dynamic Standing: Fair    ACTIVITY TOLERANCE                         O2 WALK       AM-PAC '6-Clicks' INPATIENT SHORT FORM - BASIC MOBILITY  How much difficulty does the patient currently have. .. Patient Difficulty: Turning over in bed (including adjusting bedclothes, sheets and blankets)?: A Little   Patient Difficulty: Sitting down on and standing up from a chair with arms (e.g., wheelchair, bedside commode, etc.): None   Patient Difficulty: Moving from lying on back to sitting on the side of the bed?: A Little   How much help from another person does the patient currently need. .. Help from Another: Moving to and from a bed to a chair (including a wheelchair)?: A Little   Help from Another: Need to walk in hospital room?: A Little   Help from Another: Climbing 3-5 steps with a railing?: A Little     AM-PAC Score:  Raw Score: 19   Approx Degree of Impairment: 41.77%   Standardized Score (AM-PAC Scale): 45.44   CMS Modifier (G-Code): CK      Additional information:     THERAPEUTIC EXERCISES  Lower Extremity Ankle pumps  Glut sets  Quad sets     Position Supine       Patient End of Session: Up in chair;Call light within reach;RN aware of session/findings; All patient questions and concerns addressed    CURRENT GOALS     Patient Goal Patient's self-stated goal is: to walk   Goal #1 Patient is able to demonstrate supine - sit EOB @ level: supervision     Goal #1   Current Status Min a   Goal #2 Patient is able to demonstrate transfers Sit to/from Stand at assistance level: supervision with walker - rolling     Goal #2  Current Status Min a Goal #3 Patient is able to ambulate 100 feet with assist device: walker - rolling at assistance level: supervision   Goal #3   Current Status Pt amb 2 x 125 ft with RW and CGA   Goal #4 Patient will negotiate 2 stairs/one curb w/ assistive device and supervision   Goal #4   Current Status Nnavigated 2 stairs with CGA   Goal #5 Patient to demonstrate independence with home activity/exercise instructions provided to patient in preparation for discharge. Goal #5   Current Status In progress   Goal #6    Goal #6  Current Status    Gait -15 minutes; There ex-15 minutes.

## 2023-08-08 NOTE — PLAN OF CARE
Alert and oriented x4. Anterior + posterior lumbar interbody fusion. POD 7. LSO brace at all times. 1 assist walker. BM today. Does not want stool softeners. No complaints of pain. Ileus on CT abdomen. Plan to advance diet to general and home once medically cleared. Problem: Patient Centered Care  Goal: Patient preferences are identified and integrated in the patient's plan of care  Description: Interventions:  - What would you like us to know as we care for you? Home with roommates.   - Provide timely, complete, and accurate information to patient/family  - Incorporate patient and family knowledge, values, beliefs, and cultural backgrounds into the planning and delivery of care  - Encourage patient/family to participate in care and decision-making at the level they choose  - Honor patient and family perspectives and choices  Outcome: Progressing     Problem: PAIN - ADULT  Goal: Verbalizes/displays adequate comfort level or patient's stated pain goal  Description: INTERVENTIONS:  - Encourage pt to monitor pain and request assistance  - Assess pain using appropriate pain scale  - Administer analgesics based on type and severity of pain and evaluate response  - Implement non-pharmacological measures as appropriate and evaluate response  - Consider cultural and social influences on pain and pain management  - Manage/alleviate anxiety  - Utilize distraction and/or relaxation techniques  - Monitor for opioid side effects  - Notify MD/LIP if interventions unsuccessful or patient reports new pain  - Anticipate increased pain with activity and pre-medicate as appropriate  Outcome: Progressing     Problem: RISK FOR INFECTION - ADULT  Goal: Absence of fever/infection during anticipated neutropenic period  Description: INTERVENTIONS  - Monitor WBC  - Administer growth factors as ordered  - Implement neutropenic guidelines  Outcome: Progressing     Problem: SAFETY ADULT - FALL  Goal: Free from fall injury  Description: INTERVENTIONS:  - Assess pt frequently for physical needs  - Identify cognitive and physical deficits and behaviors that affect risk of falls.   - [place] fall precautions as indicated by assessment.  - Educate pt/family on patient safety including physical limitations  - Instruct pt to call for assistance with activity based on assessment  - Modify environment to reduce risk of injury  - Provide assistive devices as appropriate  - Consider OT/PT consult to assist with strengthening/mobility  - Encourage toileting schedule  Outcome: Progressing     Problem: DISCHARGE PLANNING  Goal: Discharge to home or other facility with appropriate resources  Description: INTERVENTIONS:  - Identify barriers to discharge w/pt and caregiver  - Include patient/family/discharge partner in discharge planning  - Arrange for needed discharge resources and transportation as appropriate  - Identify discharge learning needs (meds, wound care, etc)  - Arrange for interpreters to assist at discharge as needed  - Consider post-discharge preferences of patient/family/discharge partner  - Complete POLST form as appropriate  - Assess patient's ability to be responsible for managing their own health  - Refer to Case Management Department for coordinating discharge planning if the patient needs post-hospital services based on physician/LIP order or complex needs related to functional status, cognitive ability or social support system  Outcome: Progressing

## 2023-08-09 NOTE — PAYOR COMM NOTE
Discharge Notification    Patient Name: Jerome Galaviz  Payor: Suzanna Morales POS/LUCIA  [address] [place] [address] Drive #: [id number]  Authorization Number: [id number]  AND  [id number]  Admit Date/Time: 8/1/2023 5:42 AM  Discharge Date/Time: 8/8/2023 12:21 PM

## 2023-09-21 RX ORDER — PANTOPRAZOLE SODIUM 20 MG/1
20 TABLET, DELAYED RELEASE ORAL
Qty: 90 TABLET | Refills: 0 | Status: SHIPPED | OUTPATIENT
Start: 2023-09-21

## 2023-10-19 ENCOUNTER — OFFICE VISIT (OUTPATIENT)
Dept: SURGERY | Facility: CLINIC | Age: 53
End: 2023-10-19
Payer: COMMERCIAL

## 2023-10-19 VITALS
HEIGHT: 64 IN | WEIGHT: 215 LBS | HEART RATE: 78 BPM | BODY MASS INDEX: 36.7 KG/M2 | DIASTOLIC BLOOD PRESSURE: 70 MMHG | OXYGEN SATURATION: 98 % | SYSTOLIC BLOOD PRESSURE: 110 MMHG

## 2023-10-19 DIAGNOSIS — E55.9 VITAMIN D DEFICIENCY: ICD-10-CM

## 2023-10-19 DIAGNOSIS — E44.1 MILD PROTEIN-CALORIE MALNUTRITION (HCC): Primary | ICD-10-CM

## 2023-10-19 DIAGNOSIS — F43.9 STRESS: ICD-10-CM

## 2023-10-19 DIAGNOSIS — Z51.81 ENCOUNTER FOR THERAPEUTIC DRUG MONITORING: ICD-10-CM

## 2023-10-19 DIAGNOSIS — Z98.890 S/P GASTROPLASTY: ICD-10-CM

## 2023-10-19 DIAGNOSIS — E66.9 OBESITY (BMI 30-39.9): ICD-10-CM

## 2023-10-19 PROCEDURE — 99214 OFFICE O/P EST MOD 30 MIN: CPT | Performed by: INTERNAL MEDICINE

## 2023-10-19 PROCEDURE — 3074F SYST BP LT 130 MM HG: CPT | Performed by: INTERNAL MEDICINE

## 2023-10-19 PROCEDURE — 3008F BODY MASS INDEX DOCD: CPT | Performed by: INTERNAL MEDICINE

## 2023-10-19 PROCEDURE — 3078F DIAST BP <80 MM HG: CPT | Performed by: INTERNAL MEDICINE

## 2023-10-19 RX ORDER — PHENTERMINE HYDROCHLORIDE 37.5 MG/1
37.5 TABLET ORAL
Qty: 30 TABLET | Refills: 2 | Status: SHIPPED | OUTPATIENT
Start: 2023-11-15

## 2023-10-19 RX ORDER — BUPROPION HYDROCHLORIDE 300 MG/1
300 TABLET ORAL EVERY EVENING
Qty: 90 TABLET | Refills: 1 | Status: SHIPPED | OUTPATIENT
Start: 2023-10-19

## 2023-10-23 RX ORDER — BUPROPION HYDROCHLORIDE 300 MG/1
300 TABLET ORAL EVERY EVENING
Qty: 90 TABLET | Refills: 0 | OUTPATIENT
Start: 2023-10-23

## 2023-11-01 DIAGNOSIS — L30.4 INTERTRIGO: ICD-10-CM

## 2023-11-01 RX ORDER — NYSTATIN 100000 [USP'U]/G
POWDER TOPICAL
Qty: 30 G | Refills: 2 | Status: SHIPPED | OUTPATIENT
Start: 2023-11-01

## 2023-11-06 RX ORDER — TOPIRAMATE 50 MG/1
50 TABLET, FILM COATED ORAL 2 TIMES DAILY
Qty: 180 TABLET | Refills: 1 | Status: SHIPPED | OUTPATIENT
Start: 2023-11-06

## 2023-12-18 RX ORDER — PANTOPRAZOLE SODIUM 20 MG/1
20 TABLET, DELAYED RELEASE ORAL
Qty: 90 TABLET | Refills: 0 | Status: SHIPPED | OUTPATIENT
Start: 2023-12-18

## 2023-12-19 ENCOUNTER — TELEPHONE (OUTPATIENT)
Dept: SURGERY | Facility: CLINIC | Age: 53
End: 2023-12-19

## 2024-01-05 DIAGNOSIS — E55.9 VITAMIN D DEFICIENCY: ICD-10-CM

## 2024-01-05 DIAGNOSIS — Z51.81 ENCOUNTER FOR THERAPEUTIC DRUG MONITORING: ICD-10-CM

## 2024-01-05 DIAGNOSIS — E66.9 OBESITY (BMI 30-39.9): ICD-10-CM

## 2024-01-05 DIAGNOSIS — L30.4 INTERTRIGO: ICD-10-CM

## 2024-01-05 DIAGNOSIS — F43.9 STRESS: ICD-10-CM

## 2024-01-05 DIAGNOSIS — E44.1 MILD PROTEIN-CALORIE MALNUTRITION (HCC): ICD-10-CM

## 2024-01-05 DIAGNOSIS — Z98.890 S/P GASTROPLASTY: ICD-10-CM

## 2024-01-08 RX ORDER — PHENTERMINE HYDROCHLORIDE 37.5 MG/1
37.5 TABLET ORAL
Qty: 30 TABLET | Refills: 2 | Status: SHIPPED | OUTPATIENT
Start: 2024-01-08

## 2024-01-08 RX ORDER — TOPIRAMATE 50 MG/1
50 TABLET, FILM COATED ORAL 2 TIMES DAILY
Qty: 180 TABLET | Refills: 1 | Status: SHIPPED | OUTPATIENT
Start: 2024-01-08

## 2024-01-08 RX ORDER — NYSTATIN 100000 [USP'U]/G
POWDER TOPICAL
Qty: 30 G | Refills: 2 | Status: SHIPPED | OUTPATIENT
Start: 2024-01-08

## 2024-01-08 RX ORDER — BUPROPION HYDROCHLORIDE 300 MG/1
300 TABLET ORAL EVERY EVENING
Qty: 90 TABLET | Refills: 1 | Status: SHIPPED | OUTPATIENT
Start: 2024-01-08

## 2024-03-04 RX ORDER — PANTOPRAZOLE SODIUM 20 MG/1
20 TABLET, DELAYED RELEASE ORAL
Qty: 90 TABLET | Refills: 0 | Status: SHIPPED | OUTPATIENT
Start: 2024-03-04

## 2024-04-16 ENCOUNTER — OFFICE VISIT (OUTPATIENT)
Dept: SURGERY | Facility: CLINIC | Age: 54
End: 2024-04-16
Payer: COMMERCIAL

## 2024-04-16 VITALS
SYSTOLIC BLOOD PRESSURE: 108 MMHG | HEIGHT: 64 IN | OXYGEN SATURATION: 100 % | BODY MASS INDEX: 36.16 KG/M2 | WEIGHT: 211.81 LBS | DIASTOLIC BLOOD PRESSURE: 68 MMHG | HEART RATE: 86 BPM

## 2024-04-16 DIAGNOSIS — E66.9 OBESITY (BMI 30-39.9): ICD-10-CM

## 2024-04-16 DIAGNOSIS — Z51.81 ENCOUNTER FOR THERAPEUTIC DRUG MONITORING: ICD-10-CM

## 2024-04-16 DIAGNOSIS — F43.9 STRESS: ICD-10-CM

## 2024-04-16 DIAGNOSIS — Z98.890 S/P GASTROPLASTY: ICD-10-CM

## 2024-04-16 DIAGNOSIS — D50.9 IRON DEFICIENCY ANEMIA, UNSPECIFIED IRON DEFICIENCY ANEMIA TYPE: ICD-10-CM

## 2024-04-16 DIAGNOSIS — E44.1 MILD PROTEIN-CALORIE MALNUTRITION (HCC): Primary | ICD-10-CM

## 2024-04-16 PROCEDURE — 3078F DIAST BP <80 MM HG: CPT | Performed by: INTERNAL MEDICINE

## 2024-04-16 PROCEDURE — 99214 OFFICE O/P EST MOD 30 MIN: CPT | Performed by: INTERNAL MEDICINE

## 2024-04-16 PROCEDURE — 3074F SYST BP LT 130 MM HG: CPT | Performed by: INTERNAL MEDICINE

## 2024-04-16 PROCEDURE — 3008F BODY MASS INDEX DOCD: CPT | Performed by: INTERNAL MEDICINE

## 2024-04-16 NOTE — PROGRESS NOTES
[facility]  [address]  [facility] [address]  Dept: [phone]  Loc: [phone]        Patient:  Marga Lassiter  :      1970  MRN:      [id number]    Referring Provider: Jacqueline Harmon     Chief Complaint:     Chief Complaint   Patient presents with    Follow - Up    Weight Management     Weight check        Date of Surgery:   2016  Surgery Type:   Sleeve gastrectomy     Subjective     Satiety:  positive  Food Intake:  <1 cup(s)  Fluid Intake:  64 oz  Protein Intake:  adeq grams  Vitamin:  Yes   Well-eeze  Exercise: No  Comorbidities:  SOB/SALAZAR-Improvement?  yes, Back pain-Improvement?  yes, Joint pain-Improvement?  yes and BLANQUITA-Improvement?  yes    Objective     Vitals: /68   Pulse 86   Ht 5' 4\" (1.626 m)   Wt 211 lb 12.8 oz (96.1 kg)   SpO2 100%   BMI 36.36 kg/m²     Starting weight: 272    Interval weight loss:-04   Total weight loss:  -60    Last 3 Weights   24 0935 211 lb 12.8 oz (96.1 kg)   10/19/23 0856 215 lb (97.5 kg)   23 1434 213 lb (96.6 kg)   23 0604 213 lb (96.6 kg)   23 1123 198 lb (89.8 kg)       Patient Medications:    Current Outpatient Medications:     pantoprazole 20 MG Oral Tab EC, Take 1 tablet (20 mg total) by mouth before breakfast., Disp: 90 tablet, Rfl: 0    Phentermine HCl 37.5 MG Oral Tab, Take 1 tablet (37.5 mg total) by mouth before breakfast., Disp: 30 tablet, Rfl: 2    buPROPion  MG Oral Tablet 24 Hr, Take 1 tablet (300 mg total) by mouth every evening., Disp: 90 tablet, Rfl: 1    Nystatin (NYSTOP) 678953 UNIT/GM External Powder, APPLY EXTERNANALLY TO THE AFFECTED AREA FOUR TIMES DAILY, Disp: 30 g, Rfl: 2    topiramate 50 MG Oral Tab, Take 1 tablet (50 mg total) by mouth 2 (two) times daily., Disp: 180 tablet, Rfl: 1    oxyCODONE-acetaminophen 5-325 MG Oral Tab, Take 1 tablet by mouth every 4 (four) hours as needed., Disp: , Rfl: 0    busPIRone 10 MG Oral Tab, Take  1 tablet (10 mg total) by mouth 2 (two) times daily., Disp: , Rfl:     FLUoxetine 20 MG Oral Cap, Take 1 capsule (20 mg total) by mouth daily., Disp: , Rfl:     calcium carbonate 500 MG Oral Tab, Take by mouth daily., Disp: , Rfl:     Omega-3 Fatty Acids (OMEGA-3 OR), , Disp: , Rfl:     Biotin 01917 MCG Oral Tab, Take by mouth daily., Disp: , Rfl:     albuterol 108 (90 Base) MCG/ACT Inhalation Aero Soln, Inhale 2 puffs into the lungs every 4 (four) hours as needed., Disp: , Rfl:     aspirin (ASPIRIN 81) 81 MG Oral Chew Tab, , Disp: , Rfl:     Cyanocobalamin (B-12) 250 MCG Oral Tab, Take by mouth daily., Disp: , Rfl:     Multiple Vitamins-Minerals (CELEBRATE MULTI-COMPLETE 18) Oral Cap, Take by mouth daily., Disp: , Rfl:     BELSOMRA 20 MG Oral Tab, Take 1 tablet by mouth nightly as needed., Disp: , Rfl:     clindamycin 1 % External Solution, Apply 1 Application topically As Directed., Disp: , Rfl:     Vitamin B-1 100 MG Oral Tab, Take 1 tablet (100 mg total) by mouth daily., Disp: , Rfl:     rosuvastatin 5 MG Oral Tab, Take 1 tablet (5 mg total) by mouth nightly., Disp: , Rfl:     Doxycycline Monohydrate 50 MG Oral Cap, Take 1 capsule (50 mg total) by mouth 2 (two) times daily., Disp: , Rfl:     Allergies:  Strawberries and Shellfish     Social History:    Social History     Socioeconomic History    Marital status:    Tobacco Use    Smoking status: Never    Smokeless tobacco: Never   Substance and Sexual Activity    Alcohol use: No     Alcohol/week: 0.0 standard drinks of alcohol    Drug use: No     Social Determinants of Health      Received from [facility]     Surgical History:    Past Surgical History:   Procedure Laterality Date    Cholecystectomy  05/01/2013    Colonoscopy  01/01/2012    Hysterectomy  05/01/2011    Lap sleeve gastrectomy  12/27/2016    Dr Landaverde    Reduction of large breast Bilateral 09/01/2013    Upper gi endoscopy,exam         Family History:    Family History    Problem Relation Age of Onset    Heart Disorder Father     Hypertension Father     Diabetes Father     Heart Disorder Mother     Hypertension Mother     Diabetes Mother     Cancer Mother     Hypertension Sister     Obesity Sister     Diabetes Sister         iddm since 2006       Physical Exam:  Vital signs: Blood pressure 108/68, pulse 86, height 5' 4\" (1.626 m), weight 211 lb 12.8 oz (96.1 kg), SpO2 100%, not currently breastfeeding.  General appearance: alert, appears stated age and cooperative  Head: Normocephalic, without obvious abnormality, atraumatic  Eyes: conjunctivae/corneas clear. PERRL, EOM's intact. Fundi benign.  Lungs: clear to auscultation bilaterally  Heart: S1, S2 normal, no murmur, click, rub or gallop, regular rate and rhythm  Abdomen: soft, non-tender; bowel sounds normal; no masses,  no organomegaly  Extremities: extremities normal, atraumatic, no cyanosis or edema  Skin:  redness under pannus      ROS:    Constitutional: negative  Respiratory: negative  Cardiovascular: negative  Gastrointestinal: positive for abdominal pain  Musculoskeletal:negative  Neurological: negative  Behavioral/Psych: stress  Endocrine: negative  All other systems were reviewed and are negative    Assessment       Encounter Diagnosis(ses):   Encounter Diagnoses   Name Primary?    Mild protein-calorie malnutrition (HCC) Yes    Encounter for therapeutic drug monitoring     Iron deficiency anemia, unspecified iron deficiency anemia type     Stress     Obesity (BMI 30-39.9)     S/P gastroplasty        Plan     S/p VSG 12/27/2016      Stress: continue wellbutrin    BLANQUITA: improved     Doing well    Intertrigo:   Continue Nystatin powder    S/P back surgery done.   S/P L5-S1 anterior lumbar fusion by Dr. Hills on 8/1/23     Tolerating Phentermine and Topiramate combo    Needs to increase physical activity    Iron def anemia: planning to have colonoscopy    Has PICA for ice and corn chips      bupoprion helping with stress       PPI for reflux and when taking NSAIDS      Bariatric labs due      No orders of the defined types were placed in this encounter.        Lew Hankins MD

## 2024-06-03 RX ORDER — PANTOPRAZOLE SODIUM 20 MG/1
20 TABLET, DELAYED RELEASE ORAL
Qty: 90 TABLET | Refills: 0 | Status: SHIPPED | OUTPATIENT
Start: 2024-06-03

## 2024-06-10 DIAGNOSIS — E66.9 OBESITY (BMI 30-39.9): ICD-10-CM

## 2024-06-10 DIAGNOSIS — Z98.890 S/P GASTROPLASTY: ICD-10-CM

## 2024-06-10 DIAGNOSIS — F43.9 STRESS: ICD-10-CM

## 2024-06-10 DIAGNOSIS — E44.1 MILD PROTEIN-CALORIE MALNUTRITION (HCC): ICD-10-CM

## 2024-06-10 DIAGNOSIS — E55.9 VITAMIN D DEFICIENCY: ICD-10-CM

## 2024-06-10 DIAGNOSIS — Z51.81 ENCOUNTER FOR THERAPEUTIC DRUG MONITORING: ICD-10-CM

## 2024-06-10 RX ORDER — PHENTERMINE HYDROCHLORIDE 37.5 MG/1
37.5 TABLET ORAL
Qty: 30 TABLET | Refills: 5 | Status: SHIPPED | OUTPATIENT
Start: 2024-06-10

## 2024-08-30 RX ORDER — PANTOPRAZOLE SODIUM 20 MG/1
20 TABLET, DELAYED RELEASE ORAL
Qty: 90 TABLET | Refills: 0 | Status: SHIPPED | OUTPATIENT
Start: 2024-08-30

## 2024-10-02 ENCOUNTER — OFFICE VISIT (OUTPATIENT)
Dept: SURGERY | Facility: CLINIC | Age: 54
End: 2024-10-02
Payer: COMMERCIAL

## 2024-10-02 ENCOUNTER — LAB ENCOUNTER (OUTPATIENT)
Dept: LAB | Facility: HOSPITAL | Age: 54
End: 2024-10-02
Attending: INTERNAL MEDICINE
Payer: COMMERCIAL

## 2024-10-02 VITALS
DIASTOLIC BLOOD PRESSURE: 60 MMHG | HEIGHT: 64 IN | HEART RATE: 77 BPM | SYSTOLIC BLOOD PRESSURE: 102 MMHG | BODY MASS INDEX: 33.66 KG/M2 | WEIGHT: 197.19 LBS | OXYGEN SATURATION: 98 %

## 2024-10-02 DIAGNOSIS — E44.1 MILD PROTEIN-CALORIE MALNUTRITION (HCC): ICD-10-CM

## 2024-10-02 DIAGNOSIS — E55.9 VITAMIN D DEFICIENCY: ICD-10-CM

## 2024-10-02 DIAGNOSIS — Z51.81 ENCOUNTER FOR THERAPEUTIC DRUG MONITORING: Primary | ICD-10-CM

## 2024-10-02 DIAGNOSIS — E66.9 OBESITY (BMI 30-39.9): ICD-10-CM

## 2024-10-02 DIAGNOSIS — Z98.890 S/P GASTROPLASTY: ICD-10-CM

## 2024-10-02 DIAGNOSIS — F43.9 STRESS: ICD-10-CM

## 2024-10-02 DIAGNOSIS — E51.9 THIAMINE DEFICIENCY: ICD-10-CM

## 2024-10-02 DIAGNOSIS — Z51.81 ENCOUNTER FOR THERAPEUTIC DRUG MONITORING: ICD-10-CM

## 2024-10-02 LAB
VIT B12 SERPL-MCNC: 1280 PG/ML (ref 211–911)
VIT D+METAB SERPL-MCNC: 45.8 NG/ML (ref 30–100)

## 2024-10-02 PROCEDURE — 82306 VITAMIN D 25 HYDROXY: CPT | Performed by: INTERNAL MEDICINE

## 2024-10-02 PROCEDURE — 82607 VITAMIN B-12: CPT | Performed by: INTERNAL MEDICINE

## 2024-10-02 PROCEDURE — 3008F BODY MASS INDEX DOCD: CPT | Performed by: INTERNAL MEDICINE

## 2024-10-02 PROCEDURE — 84425 ASSAY OF VITAMIN B-1: CPT | Performed by: INTERNAL MEDICINE

## 2024-10-02 PROCEDURE — 99214 OFFICE O/P EST MOD 30 MIN: CPT | Performed by: INTERNAL MEDICINE

## 2024-10-02 PROCEDURE — 3078F DIAST BP <80 MM HG: CPT | Performed by: INTERNAL MEDICINE

## 2024-10-02 PROCEDURE — 3074F SYST BP LT 130 MM HG: CPT | Performed by: INTERNAL MEDICINE

## 2024-10-02 RX ORDER — TOPIRAMATE 50 MG/1
50 TABLET, FILM COATED ORAL 2 TIMES DAILY
Qty: 180 TABLET | Refills: 1 | Status: SHIPPED | OUTPATIENT
Start: 2024-10-02 | End: 2024-10-03

## 2024-10-02 RX ORDER — BUPROPION HYDROCHLORIDE 300 MG/1
300 TABLET ORAL EVERY EVENING
Qty: 90 TABLET | Refills: 1 | Status: SHIPPED | OUTPATIENT
Start: 2024-10-02

## 2024-10-02 RX ORDER — PANTOPRAZOLE SODIUM 20 MG/1
20 TABLET, DELAYED RELEASE ORAL
Qty: 90 TABLET | Refills: 0 | Status: SHIPPED | OUTPATIENT
Start: 2024-10-02

## 2024-10-02 RX ORDER — PHENTERMINE HYDROCHLORIDE 37.5 MG/1
37.5 TABLET ORAL
Qty: 30 TABLET | Refills: 5 | Status: SHIPPED | OUTPATIENT
Start: 2024-10-02

## 2024-10-02 NOTE — PROGRESS NOTES
[facility] BARIATRIC AND WEIGHT LOSS CLINIC  [address] [facility] [address]  [facility] [address]  Dept: [phone]  Loc: [phone]        Patient:  Marga Lassiter  :      1970  MRN:      [id number]    Referring Provider: Jacqueline Harmon     Chief Complaint:     Chief Complaint   Patient presents with    Follow - Up    Weight Management       Date of Surgery:   2016  Surgery Type:   Sleeve gastrectomy     Subjective     Satiety:  positive  Food Intake:  <1 cup(s)  Fluid Intake:  64 oz  Protein Intake:  adeq grams  Vitamin:  Yes   Well-eeze  Exercise: No  Comorbidities:  SOB/SALAZAR-Improvement?  yes, Back pain-Improvement?  yes, Joint pain-Improvement?  yes and BLANQUITA-Improvement?  yes    Objective     Vitals: /60   Pulse 77   Ht 5' 4\" (1.626 m)   Wt 197 lb 3.2 oz (89.4 kg)   SpO2 98%   BMI 33.85 kg/m²     Starting weight: 272    Interval weight loss:-14   Total weight loss:  -75    Last 3 Weights   10/02/24 0909 197 lb 3.2 oz (89.4 kg)   24 0935 211 lb 12.8 oz (96.1 kg)   10/19/23 0856 215 lb (97.5 kg)       Patient Medications:    Current Outpatient Medications:     buPROPion  MG Oral Tablet 24 Hr, Take 1 tablet (300 mg total) by mouth every evening., Disp: 90 tablet, Rfl: 1    topiramate 50 MG Oral Tab, Take 1 tablet (50 mg total) by mouth 2 (two) times daily., Disp: 180 tablet, Rfl: 1    Phentermine HCl 37.5 MG Oral Tab, Take 1 tablet (37.5 mg total) by mouth before breakfast., Disp: 30 tablet, Rfl: 5    pantoprazole 20 MG Oral Tab EC, Take 1 tablet (20 mg total) by mouth before breakfast., Disp: 90 tablet, Rfl: 0    Nystatin (NYSTOP) 879732 UNIT/GM External Powder, APPLY EXTERNANALLY TO THE AFFECTED AREA FOUR TIMES DAILY, Disp: 30 g, Rfl: 2    busPIRone 10 MG Oral Tab, Take 1 tablet (10 mg total) by mouth 2 (two) times daily., Disp: , Rfl:     FLUoxetine 20 MG Oral Cap, Take 1 capsule (20 mg total) by mouth daily., Disp: , Rfl:     calcium carbonate 500 MG Oral Tab,  Take by mouth daily., Disp: , Rfl:     Omega-3 Fatty Acids (OMEGA-3 OR), , Disp: , Rfl:     Biotin 45094 MCG Oral Tab, Take by mouth daily., Disp: , Rfl:     albuterol 108 (90 Base) MCG/ACT Inhalation Aero Soln, Inhale 2 puffs into the lungs every 4 (four) hours as needed., Disp: , Rfl:     Multiple Vitamins-Minerals (CELEBRATE MULTI-COMPLETE 18) Oral Cap, Take by mouth daily., Disp: , Rfl:     BELSOMRA 20 MG Oral Tab, Take 1 tablet by mouth nightly as needed., Disp: , Rfl:     clindamycin 1 % External Solution, Apply 1 Application topically As Directed., Disp: , Rfl:     rosuvastatin 5 MG Oral Tab, Take 1 tablet (5 mg total) by mouth nightly., Disp: , Rfl:     Doxycycline Monohydrate 50 MG Oral Cap, Take 1 capsule (50 mg total) by mouth 2 (two) times daily., Disp: , Rfl:     oxyCODONE-acetaminophen 5-325 MG Oral Tab, Take 1 tablet by mouth every 4 (four) hours as needed. (Patient not taking: Reported on 10/2/2024), Disp: , Rfl: 0    aspirin (ASPIRIN 81) 81 MG Oral Chew Tab, , Disp: , Rfl:     Cyanocobalamin (B-12) 250 MCG Oral Tab, Take by mouth daily. (Patient not taking: Reported on 10/2/2024), Disp: , Rfl:     Vitamin B-1 100 MG Oral Tab, Take 1 tablet (100 mg total) by mouth daily. (Patient not taking: Reported on 10/2/2024), Disp: , Rfl:     Allergies:  Bee venom, Shellfish allergy, Strawberries, Strawberry, and Shellfish     Social History:    Social History     Socioeconomic History    Marital status:    Tobacco Use    Smoking status: Never    Smokeless tobacco: Never   Substance and Sexual Activity    Alcohol use: No     Alcohol/week: 0.0 standard drinks of alcohol    Drug use: No     Social Determinants of Health      Received from [facility]     Surgical History:    Past Surgical History:   Procedure Laterality Date    Cholecystectomy  05/01/2013    Colonoscopy  01/01/2012    Hysterectomy  05/01/2011    Lap sleeve gastrectomy  12/27/2016    Dr Landaverde    Reduction of large breast  Bilateral 09/01/2013    Upper gi endoscopy,exam         Family History:    Family History   Problem Relation Age of Onset    Heart Disorder Father     Hypertension Father     Diabetes Father     Heart Disorder Mother     Hypertension Mother     Diabetes Mother     Cancer Mother     Hypertension Sister     Obesity Sister     Diabetes Sister         iddm since 2006       Physical Exam:  Vital signs: Blood pressure 102/60, pulse 77, height 5' 4\" (1.626 m), weight 197 lb 3.2 oz (89.4 kg), SpO2 98%, not currently breastfeeding.  General appearance: alert, appears stated age and cooperative  Head: Normocephalic, without obvious abnormality, atraumatic  Eyes: conjunctivae/corneas clear. PERRL, EOM's intact. Fundi benign.  Lungs: clear to auscultation bilaterally  Heart: S1, S2 normal, no murmur, click, rub or gallop, regular rate and rhythm  Abdomen: soft, non-tender; bowel sounds normal; no masses,  no organomegaly  Extremities: extremities normal, atraumatic, no cyanosis or edema  Skin:  redness under pannus      ROS:    Constitutional: negative  Respiratory: negative  Cardiovascular: negative  Gastrointestinal: positive for abdominal pain  Musculoskeletal:negative  Neurological: negative  Behavioral/Psych: stress  Endocrine: negative  All other systems were reviewed and are negative    Assessment       Encounter Diagnosis(ses):   Encounter Diagnoses   Name Primary?    Encounter for therapeutic drug monitoring Yes    Mild protein-calorie malnutrition (HCC)     S/P gastroplasty     Obesity (BMI 30-39.9)     Stress     Vitamin D deficiency     Thiamine deficiency        Plan     S/p VSG 12/27/2016      Stress: continue wellbutrin    BLANQUITA: improved     Doing well    Intertrigo:   Continue Nystatin powder    S/P back surgery done.   S/P L5-S1 anterior lumbar fusion by Dr. Hills on 8/1/23     Tolerating Phentermine and Topiramate combo    Needs to increase physical activity       PPI for reflux and when taking  NSAIDS      Bariatric labs due      Orders Placed This Encounter   Procedures    Vitamin D, 25-Hydroxy     Standing Status:   Future     Standing Expiration Date:   10/2/2025     Order Specific Question:   Please pick the scenario that best fits the purpose for ordering this test     Answer:   General Screening/Vit D deficiency [25(OH)D]     Order Specific Question:   Release to patient     Answer:   Immediate    Vitamin B12     Standing Status:   Future     Standing Expiration Date:   10/2/2025     Order Specific Question:   Release to patient     Answer:   Immediate    Vitamin B1 (Thiamine), Blood     Standing Status:   Future     Standing Expiration Date:   10/2/2025     Order Specific Question:   Release to patient     Answer:   Immediate         Lew Hankins MD

## 2024-10-03 RX ORDER — TOPIRAMATE 50 MG/1
50 TABLET, FILM COATED ORAL 2 TIMES DAILY
Qty: 180 TABLET | Refills: 1 | Status: SHIPPED | OUTPATIENT
Start: 2024-10-03

## 2024-10-07 LAB — VITAMIN B1 WHOLE BLD: 170.8 NMOL/L

## 2025-04-28 ENCOUNTER — OFFICE VISIT (OUTPATIENT)
Dept: SURGERY | Facility: CLINIC | Age: 55
End: 2025-04-28
Payer: COMMERCIAL

## 2025-04-28 VITALS
OXYGEN SATURATION: 98 % | HEART RATE: 80 BPM | BODY MASS INDEX: 30.05 KG/M2 | WEIGHT: 176 LBS | HEIGHT: 64 IN | DIASTOLIC BLOOD PRESSURE: 60 MMHG | RESPIRATION RATE: 16 BRPM | SYSTOLIC BLOOD PRESSURE: 100 MMHG

## 2025-04-28 DIAGNOSIS — Z98.890 S/P GASTROPLASTY: ICD-10-CM

## 2025-04-28 DIAGNOSIS — E66.9 OBESITY (BMI 30-39.9): ICD-10-CM

## 2025-04-28 DIAGNOSIS — F43.9 STRESS: ICD-10-CM

## 2025-04-28 DIAGNOSIS — Z51.81 ENCOUNTER FOR THERAPEUTIC DRUG MONITORING: Primary | ICD-10-CM

## 2025-04-28 DIAGNOSIS — E44.1 MILD PROTEIN-CALORIE MALNUTRITION (HCC): ICD-10-CM

## 2025-04-28 PROCEDURE — 3078F DIAST BP <80 MM HG: CPT | Performed by: INTERNAL MEDICINE

## 2025-04-28 PROCEDURE — 3008F BODY MASS INDEX DOCD: CPT | Performed by: INTERNAL MEDICINE

## 2025-04-28 PROCEDURE — 3074F SYST BP LT 130 MM HG: CPT | Performed by: INTERNAL MEDICINE

## 2025-04-28 PROCEDURE — 99214 OFFICE O/P EST MOD 30 MIN: CPT | Performed by: INTERNAL MEDICINE

## 2025-04-28 RX ORDER — TOPIRAMATE 50 MG/1
50 TABLET, FILM COATED ORAL 2 TIMES DAILY
Qty: 180 TABLET | Refills: 1 | Status: SHIPPED | OUTPATIENT
Start: 2025-04-28

## 2025-04-28 RX ORDER — BUPROPION HYDROCHLORIDE 300 MG/1
300 TABLET ORAL EVERY EVENING
Qty: 90 TABLET | Refills: 2 | Status: SHIPPED | OUTPATIENT
Start: 2025-04-28

## 2025-04-28 RX ORDER — PANTOPRAZOLE SODIUM 40 MG/1
40 TABLET, DELAYED RELEASE ORAL
Qty: 90 TABLET | Refills: 2 | Status: SHIPPED | OUTPATIENT
Start: 2025-04-28

## 2025-04-28 NOTE — PROGRESS NOTES
[facility] [place] 2[place] BARIATRIC AND WEIGHT LOSS CLINIC  [address] [facility] [address]  [facility] [address]  Dept: [phone]  Loc: [phone]        Patient:  Marga Lassiter  :      1970  MRN:      [id number]    Referring Provider: Jacqueline Harmon     Chief Complaint:     Chief Complaint   Patient presents with    Follow - Up    Weight Management       Date of Surgery:   2016  Surgery Type:   Sleeve gastrectomy     Subjective     Satiety:  positive  Food Intake:  <1 cup(s)  Fluid Intake:  64 oz  Protein Intake:  adeq grams  Vitamin:  Yes   Well-eeze  Exercise: Yes  Walking around work      Comorbidities:  SOB/SALAZAR-Improvement?  yes, Back pain-Improvement?  yes, Joint pain-Improvement?  yes and BLANQUITA-Improvement?  yes    Objective     Vitals: /60   Pulse 80   Resp 16   Ht 5' 4\" (1.626 m)   Wt 176 lb (79.8 kg)   SpO2 98%   BMI 30.21 kg/m²     Starting weight: 272    Interval weight loss:-21   Total weight loss:  -96    Last 3 Weights   25 0912 176 lb (79.8 kg)   10/02/24 0909 197 lb 3.2 oz (89.4 kg)   24 0935 211 lb 12.8 oz (96.1 kg)       Patient Medications:    Current Outpatient Medications:     TOPIRAMATE 50 MG Oral Tab, TAKE 1 TABLET(50 MG) BY MOUTH TWICE DAILY, Disp: 180 tablet, Rfl: 1    buPROPion  MG Oral Tablet 24 Hr, Take 1 tablet (300 mg total) by mouth every evening., Disp: 90 tablet, Rfl: 1    Phentermine HCl 37.5 MG Oral Tab, Take 1 tablet (37.5 mg total) by mouth before breakfast., Disp: 30 tablet, Rfl: 5    pantoprazole 20 MG Oral Tab EC, Take 1 tablet (20 mg total) by mouth before breakfast., Disp: 90 tablet, Rfl: 0    Nystatin (NYSTOP) 172075 UNIT/GM External Powder, APPLY EXTERNANALLY TO THE AFFECTED AREA FOUR TIMES DAILY, Disp: 30 g, Rfl: 2    oxyCODONE-acetaminophen 5-325 MG Oral Tab, Take 1 tablet by mouth every 4 (four) hours as needed. (Patient not taking: Reported on 10/2/2024), Disp: , Rfl: 0    busPIRone 10 MG Oral Tab, Take 1 tablet (10 mg  total) by mouth 2 (two) times daily., Disp: , Rfl:     FLUoxetine 20 MG Oral Cap, Take 1 capsule (20 mg total) by mouth daily., Disp: , Rfl:     calcium carbonate 500 MG Oral Tab, Take by mouth daily., Disp: , Rfl:     Omega-3 Fatty Acids (OMEGA-3 OR), , Disp: , Rfl:     Biotin 17924 MCG Oral Tab, Take by mouth daily., Disp: , Rfl:     albuterol 108 (90 Base) MCG/ACT Inhalation Aero Soln, Inhale 2 puffs into the lungs every 4 (four) hours as needed., Disp: , Rfl:     aspirin (ASPIRIN 81) 81 MG Oral Chew Tab, , Disp: , Rfl:     Cyanocobalamin (B-12) 250 MCG Oral Tab, Take by mouth daily. (Patient not taking: Reported on 10/2/2024), Disp: , Rfl:     Multiple Vitamins-Minerals (CELEBRATE MULTI-COMPLETE 18) Oral Cap, Take by mouth daily., Disp: , Rfl:     BELSOMRA 20 MG Oral Tab, Take 1 tablet by mouth nightly as needed., Disp: , Rfl:     clindamycin 1 % External Solution, Apply 1 Application topically As Directed., Disp: , Rfl:     Vitamin B-1 100 MG Oral Tab, Take 1 tablet (100 mg total) by mouth daily. (Patient not taking: Reported on 10/2/2024), Disp: , Rfl:     rosuvastatin 5 MG Oral Tab, Take 1 tablet (5 mg total) by mouth nightly., Disp: , Rfl:     Doxycycline Monohydrate 50 MG Oral Cap, Take 1 capsule (50 mg total) by mouth 2 (two) times daily., Disp: , Rfl:     Allergies:  Bee venom, Shellfish allergy, Strawberries, Strawberry, and Shellfish     Social History:    Social History     Socioeconomic History    Marital status:    Tobacco Use    Smoking status: Never    Smokeless tobacco: Never   Substance and Sexual Activity    Alcohol use: No     Alcohol/week: 0.0 standard drinks of alcohol    Drug use: No     Social Drivers of Health      Received from [facility]     Surgical History:    Past Surgical History:   Procedure Laterality Date    Cholecystectomy  05/01/2013    Colonoscopy  01/01/2012    Hysterectomy  05/01/2011    Lap sleeve gastrectomy  12/27/2016    Dr Akhil Isaacs of  large breast Bilateral 09/01/2013    Upper gi endoscopy,exam         Family History:    Family History   Problem Relation Age of Onset    Heart Disorder Father     Hypertension Father     Diabetes Father     Heart Disorder Mother     Hypertension Mother     Diabetes Mother     Cancer Mother     Hypertension Sister     Obesity Sister     Diabetes Sister         iddm since 2006       Physical Exam:  Vital signs: Blood pressure 100/60, pulse 80, resp. rate 16, height 5' 4\" (1.626 m), weight 176 lb (79.8 kg), SpO2 98%, not currently breastfeeding.  General appearance: alert, appears stated age and cooperative  Head: Normocephalic, without obvious abnormality, atraumatic  Eyes: conjunctivae/corneas clear. PERRL, EOM's intact. Fundi benign.  Lungs: clear to auscultation bilaterally  Heart: S1, S2 normal, no murmur, click, rub or gallop, regular rate and rhythm  Abdomen: soft, non-tender; bowel sounds normal; no masses,  no organomegaly  Extremities: extremities normal, atraumatic, no cyanosis or edema  Skin:  redness under pannus      ROS:    Constitutional: negative  Respiratory: negative  Cardiovascular: negative  Gastrointestinal: negative  Musculoskeletal:negative  Neurological: negative  Behavioral/Psych: negative  Endocrine: negative  All other systems were reviewed and are negative    Assessment       Encounter Diagnosis(ses):   Encounter Diagnoses   Name Primary?    Encounter for therapeutic drug monitoring Yes    Mild protein-calorie malnutrition (HCC)     S/P gastroplasty     Obesity (BMI 30-39.9)        Plan     S/p VSG 12/27/2016      Stress: continue wellbutrin    BLANQUITA: improved     Doing well    Intertrigo:   Continue Nystatin powder    S/P back surgery done.   S/P L5-S1 anterior lumbar fusion by Dr. Hills on 8/1/23     Phentermine: discontinued    Topiramate: tolerating well    Needs to increase physical activity       PPI for reflux and when taking NSAIDS      No orders of the defined types were placed in  this encounter.        Lew Hankins MD

## 2025-07-18 DIAGNOSIS — E78.5 HYPERLIPIDEMIA: Primary | ICD-10-CM

## (undated) DIAGNOSIS — L30.4 INTERTRIGO: ICD-10-CM

## (undated) DEVICE — TUBING MEGADYNE SPECULUM

## (undated) DEVICE — GOWN SURG AERO BLUE PERF XLG

## (undated) DEVICE — BNDG COHESIVE W4INXL5YD TAN E

## (undated) DEVICE — 3.0MM PRECISION NEURO (MATCH HEAD)

## (undated) DEVICE — FLOSEAL HEMOSTATIC MATRIX, 5ML: Brand: FLOSEAL HEMOSTATIC MATRIX

## (undated) DEVICE — TRAY SURESTEP 16 BARDEX DRAIN

## (undated) DEVICE — WIRE FX PRCPT KRSH BVL BLNT

## (undated) DEVICE — E-Z BUTTON SWITCH PENCIL

## (undated) DEVICE — SUT VICRYL 0 CT-1 JJ31G

## (undated) DEVICE — SUT PDS II 0 CTX Z990G

## (undated) DEVICE — Device

## (undated) DEVICE — GAMMEX® NON-LATEX PI ORTHO SIZE 7, STERILE POLYISOPRENE POWDER-FREE SURGICAL GLOVE: Brand: GAMMEX

## (undated) DEVICE — GAMMEX® PI HYBRID SIZE 8, STERILE POWDER-FREE SURGICAL GLOVE, POLYISOPRENE AND NEOPRENE BLEND: Brand: GAMMEX

## (undated) DEVICE — NV I-PAS III BEVELED TIP STER

## (undated) DEVICE — X-RAY DETECTABLE SPONGES,16 PLY: Brand: VISTEC

## (undated) DEVICE — RELINE POWER

## (undated) DEVICE — PEN: MARKING STD PT 100/CS: Brand: MEDICAL ACTION INDUSTRIES

## (undated) DEVICE — DRAPE SRG U 124X80IN U REINF

## (undated) DEVICE — SOL NACL IRRIG 0.9% 1000ML BTL

## (undated) DEVICE — DRAPE SRG 90X60IN BCK TBL CVR

## (undated) DEVICE — PRONEVIEW CUSHION INSERTS LRG

## (undated) DEVICE — SUT VICRYL 2-0 CT-2 J726D

## (undated) DEVICE — C-ARMOR C-ARM EQUIPMENT COVERS CLEAR STERILE UNIVERSAL FIT 12 PER CASE: Brand: C-ARMOR

## (undated) DEVICE — C-ARM: Brand: UNBRANDED

## (undated) DEVICE — MEGADYNE E-Z CLEAN BLADE 2.75"

## (undated) DEVICE — SUT VICRYL 2-0 CT-2 J269H

## (undated) DEVICE — LAMINECTOMY: Brand: MEDLINE INDUSTRIES, INC.

## (undated) DEVICE — COVER SLV UNV DISP NTR STRL LF

## (undated) DEVICE — CONTAINER,SPECIMEN,OR STERILE,4OZ: Brand: MEDLINE

## (undated) DEVICE — KIT ACCESS MAXCESS 4

## (undated) DEVICE — SPONGE: SPECIALTY PEANUT XR 100/CS: Brand: MEDICAL ACTION INDUSTRIES

## (undated) DEVICE — DRAPE SHEET LARGE 76X55

## (undated) DEVICE — DERMABOND CLOSURE 0.7ML TOPICL

## (undated) DEVICE — APPLIER LICACLIP MCA MED 23.8

## (undated) DEVICE — GAMMEX® PI HYBRID SIZE 8.5, STERILE POWDER-FREE SURGICAL GLOVE, POLYISOPRENE AND NEOPRENE BLEND: Brand: GAMMEX

## (undated) DEVICE — SYRINGE BULB 50/CS 48/PLT: Brand: MEDEGEN MEDICAL PRODUCTS, LLC

## (undated) DEVICE — GAUZE SPONGES,12 PLY: Brand: CURITY

## (undated) DEVICE — 3M™ TEGADERM™ TRANSPARENT FILM DRESSING, 1626W, 4 IN X 4-3/4 IN (10 CM X 12 CM), 50 EACH/CARTON, 4 CARTON/CASE: Brand: 3M™ TEGADERM™

## (undated) DEVICE — CURAD NONADHERENT PAD 3X4

## (undated) DEVICE — SUT MONOCRYL 4-0 PS-2 Y496G

## (undated) DEVICE — SPONGE LAP 4X18IN 7IN LOOP

## (undated) DEVICE — WRAP COOLING BACK W/NO PILLOW

## (undated) DEVICE — GAMMEX® NON-LATEX PI ORTHO SIZE 8, STERILE POLYISOPRENE POWDER-FREE SURGICAL GLOVE: Brand: GAMMEX

## (undated) DEVICE — DISPOSABLE SLIM BIPOLAR FORCEPS, NON-STICK,: Brand: SPETZLER-MALIS

## (undated) NOTE — MR AVS SNAPSHOT
After Visit Summary   1/22/2019    Jojo Villanueva    MRN: [id number]           Visit Information     Date & Time  1/22/2019  8:30 AM Provider  Federico Yuen MD [address] [place] [address], [address] [place] [address] 143 Dept.  Phone  [phone] DO YOU KNOW WHERE TO GO? Injury & illness are never convenient. If you are dealing with a   non-emergency, consider your options before heading to an ER.          SAME DAY  APPOINTMENTS  Available at primary care offices      AFTER HOURS CARE  Venita Delacruz

## (undated) NOTE — MR AVS SNAPSHOT
[facility] [address]                Thank you for choosing us for your health care visit with Adrian Townsend MD.  We are glad to serve you and happy to provide you with this summary of your v Apply 1 Application topically 4 (four) times daily. Apply to affected areas           OPTISOURCE Chew   Chew by mouth.            Pantoprazole Sodium 40 MG Tbec   Take 1 tablet (40 mg total) by mouth every morning before breakfast.   Commonly known as:  PRO

## (undated) NOTE — MR AVS SNAPSHOT
[facility] ethology [address]  [place] [address]  [phone]               Thank you for choosing us for your health care visit with Nj Day MD.  We are glad to serve you and happy to provide you with this summary of you DISSOLVE 1 T UNDER THE TONGUE PRN  Commonly known as:  LEVSIN/SL        Nystatin 695258 UNIT/GM Powd  APPLY 1 APPLICATION TOPICALLY TO THE AFFECTED AREA FOUR TIMES DAILY  Commonly known as:  NYSTOP        Phentermine HCl 37.5 MG Tabs  Take half tablet with

## (undated) NOTE — MR AVS SNAPSHOT
[address] [facility]  [address] [phone]               Thank you for choosing us for your health care visit with [address] [place] [address] Bariatric Room.   We are glad to serve you and happy to provide you with You can access your MyChart to more actively manage your health care and view more details from this visit by going to [email or web address].   If you've recently had a stay at the Hospital you can access your discharge instructions in Yanci Sumner by ching

## (undated) NOTE — MR AVS SNAPSHOT
[facility] [place] [address] [phone]               Thank you for choosing us for your health care visit with Heather Elizabeth RD.   We are glad to serve you and happy to provide you with this summary of your view more details from this visit by going to [email or web address]. If you've recently had a stay at the Hospital you can access your discharge instructions in [employer] by going to Visits < Admission Summaries.  If you've been to the Emergency Depar

## (undated) NOTE — MR AVS SNAPSHOT
[address] [facility]  [facility] [phone]               Thank you for choosing us for your health care visit with [address] [facility],[address], [address] [place] [address].   We are glad to serve you and happy to provide you with Take 1 tablet (40 mg total) by mouth every morning before breakfast.   Commonly known as:  PROTONIX           PROAIR  (90 BASE) MCG/ACT Aers   Generic drug:  Albuterol Sulfate HFA   INL 2 PFS PO Q 6 H PRN                Where to Get Your Medications

## (undated) NOTE — MR AVS SNAPSHOT
[address] [facility]  [address] [phone]               Thank you for choosing us for your health care visit with Edmund Mclaughlin MD.  We are glad to serve you and happy to provide you with th What changed:  Another medication with the same name was removed. Continue taking this medication, and follow the directions you see here. OPTISOURCE Chew   Chew by mouth.            Pantoprazole Sodium 40 MG Tbec   Take 1 tablet (40 mg total) by

## (undated) NOTE — MR AVS SNAPSHOT
[facility] [place] [address] [facility] [phone]               Thank you for choosing us for your health care visit with Elinor Washington MD.  We are glad to serve you and happy to provide you with this summary of you Summaries. If you've been to the Emergency Department or your doctor's office, you can view your past visit information in [employer] by going to Visits < Visit Summaries. [employer] questions? Call [phone] for help.   [employer] is NOT to be used for urge

## (undated) NOTE — MR AVS SNAPSHOT
[facility] [employer] [place] [address] [facility] [phone]               Thank you for choosing us for your health care visit with Chago Rogers MD.  We are glad to serve you and happy to provide you with this summary of you view more details from this visit by going to [email or web address]. If you've recently had a stay at the Hospital you can access your discharge instructions in [employer] by going to Visits < Admission Summaries.  If you've been to the Emergency Depar

## (undated) NOTE — MR AVS SNAPSHOT
[address] [facility]  [address]                Thank you for choosing us for your health care visit with Joel Bradley MD.  We are glad to serve you and happy to provide you with Commonly known as:  PROTONIX           PROAIR  (90 Base) MCG/ACT Aers   Generic drug:  Albuterol Sulfate HFA   INL 2 PFS PO Q 6 H PRN                   MyChart     Visit MyChart  You can access your MyChart to more actively manage your health care a

## (undated) NOTE — MR AVS SNAPSHOT
[facility] [employer] [address]                Thank you for choosing us for your health care visit with Duke Holter, MD.  We are glad to serve you and happy to provide you with this summary of your v [E55.9], Protein-calorie malnutrition, mild ([facility] [address] [E44.1]           CBC With Differential With Platelet    Complete by: Mar 08, 2017 (Approximate)    Assoc Dx:  Stress [F43.9], S/P gastroplasty [Z98.890], Obesity (BMI 30-39. 9) [E66.9], Hypercholesteremia [phone], [address] [facility] [address]    Hours:  24-hours Phone:  [phone]    - BuPROPion HCl 75 MG Tabs  - Nystatin 858965 UNIT/GM Powd            MyChart     Visit MyChart  You can access your MyChart to more acti

## (undated) NOTE — MR AVS SNAPSHOT
[facility] [phone]               Thank you for choosing us for your health care visit with Andria Najera MD.  We are glad to serve you and happy to provide you with this summary of you You can access your MyChart to more actively manage your health care and view more details from this visit by going to [email or web address].   If you've recently had a stay at the Hospital you can access your discharge instructions in [address] by ching

## (undated) NOTE — MR AVS SNAPSHOT
[facility] [employer] [address]                Thank you for choosing us for your health care visit with Samira Quiles MD.  We are glad to serve you and happy to provide you with this summary of you PROAIR  (90 Base) MCG/ACT Aers   Generic drug:  Albuterol Sulfate HFA   INL 2 PFS PO Q 6 H PRN                   MyChart     Visit MyChart  You can access your MyChart to more actively manage your health care and view more details from this visit b

## (undated) NOTE — MR AVS SNAPSHOT
[facility] [address] [place]                Thank you for choosing us for your health care visit with Dorena Sacks, RD.   We are glad to serve you and happy to provide you with this summary of your If you've recently had a stay at the Hospital you can access your discharge instructions in [employer] by going to Visits < Admission Summaries.  If you've been to the Emergency Department or your doctor's office, you can view your past visit information in My